# Patient Record
Sex: FEMALE | Race: WHITE | NOT HISPANIC OR LATINO | Employment: FULL TIME | URBAN - METROPOLITAN AREA
[De-identification: names, ages, dates, MRNs, and addresses within clinical notes are randomized per-mention and may not be internally consistent; named-entity substitution may affect disease eponyms.]

---

## 2017-02-02 ENCOUNTER — ALLSCRIPTS OFFICE VISIT (OUTPATIENT)
Dept: OTHER | Facility: OTHER | Age: 47
End: 2017-02-02

## 2017-02-02 DIAGNOSIS — Z12.31 ENCOUNTER FOR SCREENING MAMMOGRAM FOR MALIGNANT NEOPLASM OF BREAST: ICD-10-CM

## 2017-06-09 ENCOUNTER — GENERIC CONVERSION - ENCOUNTER (OUTPATIENT)
Dept: OTHER | Facility: OTHER | Age: 47
End: 2017-06-09

## 2017-06-09 ENCOUNTER — HOSPITAL ENCOUNTER (OUTPATIENT)
Dept: MAMMOGRAPHY | Facility: HOSPITAL | Age: 47
Discharge: HOME | End: 2017-06-09
Payer: COMMERCIAL

## 2017-06-09 DIAGNOSIS — Z12.31 ENCOUNTER FOR SCREENING MAMMOGRAM FOR MALIGNANT NEOPLASM OF BREAST: ICD-10-CM

## 2017-06-09 PROCEDURE — G0202 SCR MAMMO BI INCL CAD: HCPCS

## 2017-06-13 DIAGNOSIS — R92.8 OTHER ABNORMAL AND INCONCLUSIVE FINDINGS ON DIAGNOSTIC IMAGING OF BREAST: ICD-10-CM

## 2017-07-03 ENCOUNTER — GENERIC CONVERSION - ENCOUNTER (OUTPATIENT)
Dept: OTHER | Facility: OTHER | Age: 47
End: 2017-07-03

## 2017-07-03 ENCOUNTER — HOSPITAL ENCOUNTER (OUTPATIENT)
Dept: MAMMOGRAPHY | Facility: CLINIC | Age: 47
Discharge: HOME | End: 2017-07-03
Payer: COMMERCIAL

## 2017-07-03 DIAGNOSIS — R92.8 OTHER ABNORMAL AND INCONCLUSIVE FINDINGS ON DIAGNOSTIC IMAGING OF BREAST: ICD-10-CM

## 2017-07-03 PROCEDURE — G0206 DX MAMMO INCL CAD UNI: HCPCS

## 2017-07-10 ENCOUNTER — HOSPITAL ENCOUNTER (OUTPATIENT)
Dept: MAMMOGRAPHY | Facility: CLINIC | Age: 47
Discharge: HOME | End: 2017-07-10
Payer: COMMERCIAL

## 2017-07-10 VITALS — DIASTOLIC BLOOD PRESSURE: 64 MMHG | HEART RATE: 68 BPM | SYSTOLIC BLOOD PRESSURE: 108 MMHG

## 2017-07-10 DIAGNOSIS — Z98.890 STATUS POST BIOPSY: ICD-10-CM

## 2017-07-10 DIAGNOSIS — R92.8 ABNORMAL MAMMOGRAM, UNSPECIFIED: ICD-10-CM

## 2017-07-10 DIAGNOSIS — R92.8 OTHER ABNORMAL AND INCONCLUSIVE FINDINGS ON DIAGNOSTIC IMAGING OF BREAST: ICD-10-CM

## 2017-07-10 PROCEDURE — 88305 TISSUE EXAM BY PATHOLOGIST: CPT | Performed by: OBSTETRICS & GYNECOLOGY

## 2017-07-10 PROCEDURE — 88341 IMHCHEM/IMCYTCHM EA ADD ANTB: CPT | Performed by: OBSTETRICS & GYNECOLOGY

## 2017-07-10 PROCEDURE — 88342 IMHCHEM/IMCYTCHM 1ST ANTB: CPT | Performed by: OBSTETRICS & GYNECOLOGY

## 2017-07-10 PROCEDURE — 19081 BX BREAST 1ST LESION STRTCTC: CPT

## 2017-07-10 RX ORDER — LIDOCAINE HYDROCHLORIDE AND EPINEPHRINE BITARTRATE 20; .01 MG/ML; MG/ML
9 INJECTION, SOLUTION SUBCUTANEOUS ONCE
Status: COMPLETED | OUTPATIENT
Start: 2017-07-10 | End: 2017-07-10

## 2017-07-10 RX ORDER — LIDOCAINE HYDROCHLORIDE 10 MG/ML
4 INJECTION, SOLUTION INFILTRATION; PERINEURAL ONCE
Status: COMPLETED | OUTPATIENT
Start: 2017-07-10 | End: 2017-07-10

## 2017-07-10 RX ADMIN — LIDOCAINE HYDROCHLORIDE AND EPINEPHRINE 9 ML: 20; 10 INJECTION, SOLUTION INFILTRATION; PERINEURAL at 09:10

## 2017-07-10 RX ADMIN — LIDOCAINE HYDROCHLORIDE 4 ML: 10 INJECTION, SOLUTION INFILTRATION; PERINEURAL at 09:10

## 2017-11-09 ENCOUNTER — ALLSCRIPTS OFFICE VISIT (OUTPATIENT)
Dept: OTHER | Facility: OTHER | Age: 47
End: 2017-11-09

## 2017-11-09 DIAGNOSIS — Z00.00 ENCOUNTER FOR GENERAL ADULT MEDICAL EXAMINATION WITHOUT ABNORMAL FINDINGS: ICD-10-CM

## 2017-11-09 DIAGNOSIS — E55.9 VITAMIN D DEFICIENCY: ICD-10-CM

## 2017-11-09 DIAGNOSIS — K11.1 HYPERTROPHY OF SALIVARY GLAND: ICD-10-CM

## 2017-11-09 DIAGNOSIS — Z13.6 ENCOUNTER FOR SCREENING FOR CARDIOVASCULAR DISORDERS: ICD-10-CM

## 2017-11-21 ENCOUNTER — HOSPITAL ENCOUNTER (OUTPATIENT)
Dept: ULTRASOUND IMAGING | Facility: HOSPITAL | Age: 47
Discharge: HOME | End: 2017-11-21
Payer: COMMERCIAL

## 2017-11-21 DIAGNOSIS — K11.1 HYPERTROPHY OF SALIVARY GLAND: ICD-10-CM

## 2017-11-21 PROCEDURE — 76536 US EXAM OF HEAD AND NECK: CPT

## 2017-11-22 ENCOUNTER — GENERIC CONVERSION - ENCOUNTER (OUTPATIENT)
Dept: OTHER | Facility: OTHER | Age: 47
End: 2017-11-22

## 2017-11-22 ENCOUNTER — APPOINTMENT (OUTPATIENT)
Dept: LAB | Facility: CLINIC | Age: 47
End: 2017-11-22
Payer: COMMERCIAL

## 2017-11-22 DIAGNOSIS — Z00.00 ENCOUNTER FOR GENERAL ADULT MEDICAL EXAMINATION WITHOUT ABNORMAL FINDINGS: ICD-10-CM

## 2017-11-22 DIAGNOSIS — K11.1 HYPERTROPHY OF SALIVARY GLAND: ICD-10-CM

## 2017-11-22 DIAGNOSIS — Z13.6 ENCOUNTER FOR SCREENING FOR CARDIOVASCULAR DISORDERS: ICD-10-CM

## 2017-11-22 DIAGNOSIS — E55.9 VITAMIN D DEFICIENCY: ICD-10-CM

## 2017-11-22 LAB
25(OH)D3 SERPL-MCNC: 40.4 NG/ML (ref 30–100)
ALBUMIN SERPL BCP-MCNC: 3.2 G/DL (ref 3.5–5)
ALP SERPL-CCNC: 48 U/L (ref 46–116)
ALT SERPL W P-5'-P-CCNC: 19 U/L (ref 12–78)
ANION GAP SERPL CALCULATED.3IONS-SCNC: 8 MMOL/L (ref 4–13)
AST SERPL W P-5'-P-CCNC: 19 U/L (ref 5–45)
BASOPHILS # BLD AUTO: 0.01 THOUSANDS/ΜL (ref 0–0.1)
BASOPHILS NFR BLD AUTO: 0 % (ref 0–1)
BILIRUB SERPL-MCNC: 0.43 MG/DL (ref 0.2–1)
BUN SERPL-MCNC: 19 MG/DL (ref 5–25)
CALCIUM SERPL-MCNC: 8.5 MG/DL (ref 8.3–10.1)
CHLORIDE SERPL-SCNC: 106 MMOL/L (ref 100–108)
CHOLEST SERPL-MCNC: 162 MG/DL (ref 50–200)
CO2 SERPL-SCNC: 25 MMOL/L (ref 21–32)
CREAT SERPL-MCNC: 0.96 MG/DL (ref 0.6–1.3)
EOSINOPHIL # BLD AUTO: 0.11 THOUSAND/ΜL (ref 0–0.61)
EOSINOPHIL NFR BLD AUTO: 2 % (ref 0–6)
ERYTHROCYTE [DISTWIDTH] IN BLOOD BY AUTOMATED COUNT: 14.4 % (ref 11.6–15.1)
GFR SERPL CREATININE-BSD FRML MDRD: 71 ML/MIN/1.73SQ M
GLUCOSE P FAST SERPL-MCNC: 84 MG/DL (ref 65–99)
HCT VFR BLD AUTO: 39.1 % (ref 34.8–46.1)
HDLC SERPL-MCNC: 77 MG/DL (ref 40–60)
HGB BLD-MCNC: 12.8 G/DL (ref 11.5–15.4)
LDLC SERPL CALC-MCNC: 64 MG/DL (ref 0–100)
LYMPHOCYTES # BLD AUTO: 2.25 THOUSANDS/ΜL (ref 0.6–4.47)
LYMPHOCYTES NFR BLD AUTO: 42 % (ref 14–44)
MCH RBC QN AUTO: 29.8 PG (ref 26.8–34.3)
MCHC RBC AUTO-ENTMCNC: 32.7 G/DL (ref 31.4–37.4)
MCV RBC AUTO: 91 FL (ref 82–98)
MONOCYTES # BLD AUTO: 0.62 THOUSAND/ΜL (ref 0.17–1.22)
MONOCYTES NFR BLD AUTO: 12 % (ref 4–12)
NEUTROPHILS # BLD AUTO: 2.4 THOUSANDS/ΜL (ref 1.85–7.62)
NEUTS SEG NFR BLD AUTO: 44 % (ref 43–75)
NRBC BLD AUTO-RTO: 0 /100 WBCS
PLATELET # BLD AUTO: 237 THOUSANDS/UL (ref 149–390)
PMV BLD AUTO: 11 FL (ref 8.9–12.7)
POTASSIUM SERPL-SCNC: 3.9 MMOL/L (ref 3.5–5.3)
PROT SERPL-MCNC: 6.3 G/DL (ref 6.4–8.2)
RBC # BLD AUTO: 4.29 MILLION/UL (ref 3.81–5.12)
SODIUM SERPL-SCNC: 139 MMOL/L (ref 136–145)
TRIGL SERPL-MCNC: 106 MG/DL
WBC # BLD AUTO: 5.4 THOUSAND/UL (ref 4.31–10.16)

## 2017-11-22 PROCEDURE — 82306 VITAMIN D 25 HYDROXY: CPT

## 2017-11-22 PROCEDURE — 85025 COMPLETE CBC W/AUTO DIFF WBC: CPT

## 2017-11-22 PROCEDURE — 80061 LIPID PANEL: CPT

## 2017-11-22 PROCEDURE — 36415 COLL VENOUS BLD VENIPUNCTURE: CPT

## 2017-11-22 PROCEDURE — 80053 COMPREHEN METABOLIC PANEL: CPT

## 2017-11-24 ENCOUNTER — GENERIC CONVERSION - ENCOUNTER (OUTPATIENT)
Dept: OTHER | Facility: OTHER | Age: 47
End: 2017-11-24

## 2017-11-28 PROCEDURE — 88173 CYTOPATH EVAL FNA REPORT: CPT | Performed by: SURGERY

## 2017-11-29 ENCOUNTER — LAB REQUISITION (OUTPATIENT)
Dept: LAB | Facility: HOSPITAL | Age: 47
End: 2017-11-29
Payer: COMMERCIAL

## 2017-11-29 DIAGNOSIS — R59.9 ENLARGED LYMPH NODES: ICD-10-CM

## 2017-12-06 ENCOUNTER — HOSPITAL ENCOUNTER (OUTPATIENT)
Facility: HOSPITAL | Age: 47
Setting detail: OUTPATIENT SURGERY
Discharge: HOME | End: 2017-12-06
Attending: SURGERY | Admitting: SURGERY
Payer: COMMERCIAL

## 2017-12-06 VITALS
WEIGHT: 140 LBS | SYSTOLIC BLOOD PRESSURE: 113 MMHG | BODY MASS INDEX: 22.5 KG/M2 | HEIGHT: 66 IN | DIASTOLIC BLOOD PRESSURE: 68 MMHG | RESPIRATION RATE: 19 BRPM | TEMPERATURE: 99.1 F | OXYGEN SATURATION: 100 % | HEART RATE: 62 BPM

## 2017-12-06 DIAGNOSIS — R22.2 LOCALIZED SWELLING, MASS AND LUMP, TRUNK: ICD-10-CM

## 2017-12-06 PROCEDURE — 88331 PATH CONSLTJ SURG 1 BLK 1SPC: CPT | Performed by: PATHOLOGY

## 2017-12-06 PROCEDURE — 88307 TISSUE EXAM BY PATHOLOGIST: CPT | Performed by: SURGERY

## 2017-12-06 RX ORDER — BUPIVACAINE HYDROCHLORIDE AND EPINEPHRINE 2.5; 5 MG/ML; UG/ML
INJECTION, SOLUTION EPIDURAL; INFILTRATION; INTRACAUDAL; PERINEURAL AS NEEDED
Status: DISCONTINUED | OUTPATIENT
Start: 2017-12-06 | End: 2017-12-06 | Stop reason: HOSPADM

## 2017-12-06 NOTE — DISCHARGE INSTRUCTIONS
Please call return to the office on Monday 12/12/17  You are ok to shower just no soaks   Tylenol or Advil for pain   Please avoid aspirin   Some bruising and swelling is to be expected and is normal

## 2017-12-06 NOTE — OP NOTE
OPERATIVE REPORT  PATIENT NAME: Jessica Mathias    :  1970  MRN: [id number]  Pt Location: [facility] OR ROOM 06    SURGERY DATE: 2017    Surgeon(s) and Role:     Medardo Alvarado MD - Primary     * Claudeen Lime - Assisting    Preop Diagnosis:  Localized swelling, mass and lump, trunk [R22 2]    Post-Op Diagnosis Codes:     * Localized swelling, mass and lump, trunk [R22 2]    Procedure(s) (LRB):  EXCISION NODULE CERVICAL REGION (Right)    Specimen(s):  ID Type Source Tests Collected by Time Destination   1 : Right submandibular nodule Tissue Mass TISSUE EXAM Moon Sánchez MD 2017 1127        Estimated Blood Loss:   Minimal    Drains:       Anesthesia Type:   Local    Operative Indications:  Localized swelling, mass and lump, trunk [R22 2]      Operative Findings:  Local anesthesia    Complications:   None    Procedure and Technique:  Patient was identified visually and via armband  Placed in the supine position  The region was prepped and draped in a sterile fashion  Time-out was completed  1% lidocaine was used throughout the procedure  A half-inch incision was created over the submandibular region  The nodule was quite mobile moving approximately 1 inch in most directions  It was not fixed  Incision was carried down through skin subcutaneous tissue  The mass after muscle was split  The mobile nodule was located  It seemed and capsulated  And it was quite hard as what the preoperative evaluation was consistent with  The mass was enucleated from its surrounding surfaces  There was no evidence for attachment to contiguous structures  There is no evidence for exposure to the parotid gland  The area was irrigated  It was aspirated dry  Hemostasis was assured  The wound was then closed with 3 0 Vicryl subcutaneous and then 4 0 Prolene sutures  Steri-Strip was applied  The patient tolerated the procedure well  Sponge and instrument count correct     I was present for the entire procedure    Patient Disposition:  PACU     SIGNATURE: Moon Sánchez MD  DATE: December 6, 2017  TIME: 11:56 AM

## 2017-12-11 ENCOUNTER — GENERIC CONVERSION - ENCOUNTER (OUTPATIENT)
Dept: FAMILY MEDICINE CLINIC | Facility: CLINIC | Age: 47
End: 2017-12-11

## 2018-01-09 NOTE — MISCELLANEOUS
Message   Recorded as Task   Date: 06/13/2017 12:20 PM, Created By: Ward Drew   Task Name: Follow Up   Assigned To: Diamante Muñiz   Regarding Patient: Emre LU, Status: In Progress   Matthew Nava - 13 Jun 2017 12:20 PM     TASK CREATED  Patient aware of mammogram results, dx R92 8  Scheduled for a (L) diagnostic mammogram on 7/3/17  Please enter order in allscripts  Thanks   Dyan Teixeira - 13 Jun 2017 12:31 PM     TASK IN PROGRESS   Dyan Teixeira - 13 Jun 2017 12:37 PM     TASK EDITED  order placed in allscripts for lft diag gloria        Active Problems    1  Encounter for gynecological examination without abnormal finding (V72 31) (Z01 419)   2  Encounter for routine gynecological examination with Papanicolaou smear of cervix   (V72 31,V76 2) (Z01 419)   3  Encounter for screening mammogram for malignant neoplasm of breast (V76 12)   (Z12 31)   4  Hematuria (599 70) (R31 9)   5  Mammogram abnormal (793 80) (R92 8)   6  Screening for human papillomavirus (HPV) (V73 81) (Z11 51)   7  Urinary frequency (788 41) (R35 0)   8  Uses oral contraceptives (V25 41) (Z30 41)   9  Vitamin D deficiency (268 9) (E55 9)    Current Meds   1  Gianvi 3-0 02 MG Oral Tablet; Take 1 tablet daily; Therapy: 94HXA9953 to (Last Rx:57Lif8466)  Requested for: 98OCS4324 Ordered   2  Vitamin D (Ergocalciferol) 26410 UNIT Oral Capsule; TAKE 1 CAPSULE Weekly; Therapy: 63IVU9552 to (Evaluate:13Mar2015)  Requested for: 71CLO5005; Last   Rx:30Jan2015 Ordered    Allergies    1  Codeine Derivatives   2  Codeine Sulfate TABS   3  Tylenol with Codeine #3 TABS    Plan  Mammogram abnormal    · MAMMO DIAGNOSTIC LEFT W CAD; Status:Hold For - Scheduling,Retrospective  Authorization;  Requested JSP:[id number];     Signatures   Electronically signed by : Delia Valdivia, ; Jun 13 2017 12:37PM EST                       (Author)

## 2018-01-10 NOTE — MISCELLANEOUS
Message   Recorded as Task   Date: 07/03/2017 03:18 PM, Created By: Marilyn Knapp   Task Name: Result Follow Up   Assigned To: Laura Delgado   Regarding Patient: Voncile Ganser, Status: Active   CommentNatalio Bennett - 03 Jul 2017 3:18 PM     TASK CREATED  ask patient if she has follow up with radiology   Tomi Pipe - 03 Jul 2017 3:30 PM     TASK EDITED  BX already ordered        Active Problems    1  Encounter for gynecological examination without abnormal finding (V72 31) (Z01 419)   2  Encounter for routine gynecological examination with Papanicolaou smear of cervix   (V72 31,V76 2) (Z01 419)   3  Encounter for screening mammogram for malignant neoplasm of breast (V76 12)   (Z12 31)   4  Hematuria (599 70) (R31 9)   5  Mammogram abnormal (793 80) (R92 8)   6  Screening for human papillomavirus (HPV) (V73 81) (Z11 51)   7  Urinary frequency (788 41) (R35 0)   8  Uses oral contraceptives (V25 41) (Z30 41)   9  Vitamin D deficiency (268 9) (E55 9)    Current Meds   1  Gianvi 3-0 02 MG Oral Tablet; Take 1 tablet daily; Therapy: 88CQU3234 to (Last Rx:84Ndj2586)  Requested for: 50NFF7363 Ordered   2  Vitamin D (Ergocalciferol) 60182 UNIT Oral Capsule; TAKE 1 CAPSULE Weekly; Therapy: 37DVT6230 to (Evaluate:13Mar2015)  Requested for: 57UEQ6854; Last   Rx:30Jan2015 Ordered    Allergies    1  Codeine Derivatives   2  Codeine Sulfate TABS   3  Tylenol with Codeine #3 TABS    Signatures   Electronically signed by :  Moy Neely, ; Jul  3 2017  3:30PM EST                       (Author)

## 2018-01-11 NOTE — PROGRESS NOTES
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)   3  Encounter for screening for cardiovascular disorders (V81 2) (Z13 6)   4  Tetanus-diphtheria (Td) vaccination (V06 5) (Z23)    Plan  Encounter for screening for cardiovascular disorders    · (1) LIPID PANEL, FASTING; Status:Active; Requested FGW:49HWP0478; Health Maintenance    · Follow-up visit in 1 year Evaluation and Treatment  Follow-up  Status: Hold For -  Scheduling  Requested for: 24OXN5767   · Drink plenty of fluids ; Status:Complete;   Done: 85ZIY1922 08:53AM   · Eat a normal well-balanced diet ; Status:Complete;   Done: 96PDY4074 08:53AM   · Eat foods that are high in calcium ; Status:Complete;   Done: 39FTL2650 08:53AM   · We encourage all of our patients to exercise regularly  30 minutes of exercise or physical  activity five or more days a week is recommended for children and adults ;  Status:Complete;   Done: 79GMG8240 08:53AM  Health Maintenance, Submandibular gland hypertrophy    · (1) CBC/PLT/DIFF; Status:Active; Requested for:09Nov2017;    · (1) COMPREHENSIVE METABOLIC PANEL; Status:Active; Requested for:09Nov2017;   Submandibular gland hypertrophy    · US HEAD NECK SOFT TISSUE; Status:Hold For - Scheduling; Requested  for:09Nov2017;   Tetanus-diphtheria (Td) vaccination    · Adacel 5-2-15 5 LF-MCG/0 5 Intramuscular Suspension; INJECT 0 5  ML  Intramuscular; To Be Done: 99BIA8440  Vitamin D deficiency    · (1) VITAMIN D 25-HYDROXY; Status:Active; Requested BDN:63PUR6848;     Discussion/Summary  health maintenance visit Currently, she eats a healthy diet and has an adequate exercise regimen  the risks and benefits of cervical cancer screening were discussed cervical cancer screening is current Breast cancer screening: mammogram is current  Colorectal cancer screening: colorectal cancer screening is not indicated   Screening lab work includes hemoglobin, glucose, lipid profile and 25-hydroxyvitamin D  The immunizations will be given as outlined in the orders  Advice and education were given regarding nutrition, aerobic exercise, weight bearing exercise, calcium supplements, vitamin D supplements, reproductive health, cardiovascular risk reduction and alcohol use  22-year-old healthy adult here for annual physical  No concerns on physical exam today except for enlarged palpable submandibular lymph node for which I suggested an ultrasound of the area  Will follow up with the results when available  Possible side effects of new medications were reviewed with the patient/guardian today  The treatment plan was reviewed with the patient/guardian  The patient/guardian understands and agrees with the treatment plan      Chief Complaint  Preventative      History of Present Illness  HM, Adult Female: The patient is being seen for a health maintenance evaluation  The last health maintenance visit was year(s) ago  Social History: Household members include 2 kids  She is   Work status: occupation: Professor  The patient has never smoked cigarettes  She reports occasional alcohol use  General Health: The patient's health since the last visit is described as good  She has regular dental visits  She denies vision problems  She denies hearing loss  Lifestyle:  She consumes a diverse and healthy diet  She does not have any weight concerns  She exercises regularly  She does not use tobacco  She consumes alcohol  She reports occasional alcohol use  Reproductive health:  she reports normal menses  Menstrual history: LMP: the last menstrual period was 10/17/17 and it was of a normal amount and duration  she uses contraception  For contraception, she uses oral contraception pills  Screening: cancer screening reviewed and current  metabolic screening reviewed and current     HPI: Patient is here for annual physical   She is concerned about a small swelling under her right jaw  Patient states that the swelling is present for the past 15 years and has not changed  She does report that the area appears slightly more firm than usual   She denies any symptoms of frequent upper respiratory or ear infection/ difficulty swallowing  Review of Systems    Constitutional: as noted in HPI  Eyes: No complaints of eye pain, no red eyes, no eyesight problems, no discharge, no dry eyes, no itching of eyes  ENT: no complaints of earache, no loss of hearing, no nose bleeds, no nasal discharge, no sore throat, no hoarseness  Cardiovascular: No complaints of slow heart rate, no fast heart rate, no chest pain, no palpitations, no leg claudication, no lower extremity edema  Respiratory: No complaints of shortness of breath, no wheezing, no cough, no SOB on exertion, no orthopnea, no PND  Gastrointestinal: No complaints of abdominal pain, no constipation, no nausea or vomiting, no diarrhea, no bloody stools  Musculoskeletal: No complaints of arthralgias, no myalgias, no joint swelling or stiffness, no limb pain or swelling  Neurological: No complaints of headache, no confusion, no convulsions, no numbness, no dizziness or fainting, no tingling, no limb weakness, no difficulty walking  Psychiatric: Not suicidal, no sleep disturbance, no anxiety or depression, no change in personality, no emotional problems  Endocrine: No complaints of proptosis, no hot flashes, no muscle weakness, no deepening of the voice, no feelings of weakness  Hematologic/Lymphatic: as noted in HPI  Active Problems    1  Encounter for gynecological examination without abnormal finding (V72 31) (Z01 419)   2  Encounter for routine gynecological examination with Papanicolaou smear of cervix   (V72 31,V76 2) (Z01 419)   3  Encounter for screening mammogram for malignant neoplasm of breast (V76 12)   (Z12 31)   4  Hematuria (599 70) (R31 9)   5  Mammogram abnormal (793 80) (R92 8)   6   Need for influenza vaccination (V04 81) (Z23)   7  Screening for human papillomavirus (HPV) (V73 81) (Z11 51)   8  Urinary frequency (788 41) (R35 0)   9  Uses oral contraceptives (V25 41) (Z30 41)   10  Vitamin D deficiency (268 9) (E55 9)    Past Medical History    · History of Acute Bacterial Pyelonephritis (590 10)   · History of Cough (786 2) (R05)   · History of High risk HPV infection (079 4) (B97 7)   · History of acute sinusitis (V12 69) (Z87 09)   · History of allergic rhinitis (V12 69) (Z87 09)   · History of headache (V13 89) (H43 486)   · History of infectious mononucleosis (V12 09) (Z86 19)   · History of urinary tract infection (V13 02) (Z87 440)   · History of Low grade squamous intraepithelial lesion (LGSIL) on Papanicolaou smear of  cervix (795 03) (F49 647)   · History of Mild cervical dysplasia (622 11) (N87 0)   · History of Multiple abrasions (919 0) (T07  XXXA)   · Normal delivery (650) (O80,Z37  9)   · History of Urinary Tract Infection (V13 02)    Surgical History    · History of Colposcopy Cervix With Biopsy(S) With Endocervical Curettage    Family History  Mother    · Denied: Family history of depression   · Denied: Family history of substance abuse  Father    · Denied: Family history of depression   · Denied: Family history of substance abuse  Sibling    · Denied: Family history of depression   · Denied: Family history of substance abuse  Maternal Grandmother    · Family history of Heart Disease (V17 49)   · Family history of Hypertension (V17 49)  Paternal Grandmother    · Family history of Amyotrophic Lateral Sclerosis  Maternal Grandfather    · Family history of Prostate Cancer (V16 42)  Paternal Grandfather    · Family history of Parkinson Disease  Family History    · Family history of Arthritis (V17 7)   · Family history of Cystic Fibrosis (V18 19)   · Family history of Headache Syndromes   · Family history of Osteoporosis (V17 81)   · Family history of Trisomy 24 (Down Syndrome)    Social History    · Being A Social Drinker   · Daily Coffee Consumption (2  Cups/Day)   · Exercising Regularly   · Marital History - Currently    · Never A Smoker   · Uses oral contraceptives (V25 41) (Z30 41)    Current Meds   1  Allergy Relief 180 MG Oral Tablet; 1 TAB DAILY; Therapy: 36NVE9522 to Recorded   2  Gianvi 3-0 02 MG Oral Tablet; Take 1 tablet daily; Therapy: 60VWK8653 to (Last Rx:83Bji0228)  Requested for: 38VEJ6903 Ordered   3  Vitamin D (Ergocalciferol) 51180 UNIT Oral Capsule; TAKE 1 CAPSULE Weekly; Therapy: 16NBW1965 to (Evaluate:13Mar2015)  Requested for: 14SMR0849; Last   Rx:30Jan2015 Ordered    Allergies    1  Codeine Derivatives   2  Codeine Sulfate TABS   3  Tylenol with Codeine #3 TABS    Vitals   Recorded: 40RMN1954 08:10AM   Heart Rate 72   Respiration 14   Systolic 328   Diastolic 64   Height 5 ft 6 2 in   Weight 141 lb    BMI Calculated 22 62   BSA Calculated 1 73   O2 Saturation 100     Physical Exam    Constitutional   General appearance: No acute distress, well appearing and well nourished  Ears, Nose, Mouth, and Throat   Otoscopic examination: Tympanic membranes translucent with normal light reflex  Canals patent without erythema  Oropharynx: Normal with no erythema, edema, exudate or lesions  Neck   Neck: Supple, symmetric, trachea midline, no masses  Thyroid: Normal, no thyromegaly  Pulmonary   Auscultation of lungs: Clear to auscultation  Cardiovascular   Auscultation of heart: Normal rate and rhythm, normal S1 and S2, no murmurs  Examination of extremities for edema and/or varicosities: Normal     Abdomen   Abdomen: Non-tender, no masses  Liver and spleen: No hepatomegaly or splenomegaly  Lymphatic   Palpation of lymph nodes in neck: Abnormal   0 5 cm right submandibular gland, firm, mobile  Musculoskeletal   Gait and station: Normal     Neurologic   Cortical function: Normal mental status  Coordination: Normal finger to nose and heel to shin  Psychiatric   Orientation to person, place, and time: Normal     Mood and affect: Normal        Results/Data  PHQ-2 Adult Depression Screening 51AXS3379 08:21AM User, Ahs     Test Name Result Flag Reference   PHQ-2 Adult Depression Score 0     Over the last two weeks, how often have you been bothered by any of the following problems? Little interest or pleasure in doing things: Not at all - 0  Feeling down, depressed, or hopeless: Not at all - 0   PHQ-2 Adult Depression Screening Negative         Future Appointments    Date/Time Provider Specialty Site   02/05/2018 01:20 PM KADEN Dyer   Obstetrics/Gynecology [facility]     Signatures   Electronically signed by : Kurt Sapp MD; Nov 9 2017  8:54AM EST                       (Author)

## 2018-01-12 NOTE — RESULT NOTES
Verified Results  (1) URINALYSIS w URINE C/S REFLEX (will reflex a microscopy if leukocytes, occult blood, or nitrites are not within normal limits) 03AJA9460 12:00AM Vj Lee     Test Name Result Flag Reference   COLOR YELLOW  YELLOW   APPEARANCE CLEAR  CLEAR   SPECIFIC GRAVITY 1 005  1 001-1 035   PH 6 5  5 0-8 0   GLUCOSE NEGATIVE  NEGATIVE   BILIRUBIN NEGATIVE  NEGATIVE   KETONES NEGATIVE  NEGATIVE   OCCULT BLOOD 2+ A NEGATIVE   PROTEIN NEGATIVE  NEGATIVE   NITRITE NEGATIVE  NEGATIVE   LEUKOCYTE ESTERASE 2+ A NEGATIVE   WBC 6-10 /HPF A < OR = 5   RBC 0-2 /HPF  < OR = 2   SQUAMOUS EPITHELIAL CELLS 0-5 /HPF  < OR = 5   BACTERIA MANY /HPF A NONE SEEN   HYALINE CAST NONE SEEN /LPF  NONE SEEN   REFLEXIVE URINE CULTURE      CULTURE INDICATED - RESULTS TO FOLLOW     REFLEXIVE URINE CULTURE 15Jun2016 12:00AM Vj Lee     Test Name Result Flag Reference   CULTURE, URINE, ROUTINE  A    CULTURE, URINE, ROUTINE         MICRO NUMBER:      [id number]    TEST STATUS:       FINAL    SPECIMEN SOURCE:   URINE    SPECIMEN QUALITY:  ADEQUATE    RESULT:            Greater than 100,000 CFU/mL of Escherichia coli                            E coli                            ----------------                            INT   JANNETTE     AMOX/CLAVULANATE       S     <=2 0     AMPICILLIN             S     <=2 0     AMP/SULBACTAM          S     <=2 0     CEFAZOLIN              NR    <=4 0 **1     CEFEPIME               S     <=1 0     CEFTRIAXONE            S     <=1 0     CIPROFLOXACIN          S     <=0 25     ERTAPENEM              S     <=0 5     GENTAMICIN             S     <=1 0     IMIPENEM               S     <=0 25     LEVOFLOXACIN           S     <=0 12     NITROFURANTOIN         S     <=16 0     PIP/TAZOBACTAM         S     <=4 0     TOBRAMYCIN             S     <=1 0     TRIMETHOPRIM/SULFA     S     <=20 0  S=Susceptible  I=Intermediate  R=Resistant  * = Not Tested  NR = Not Reported  **NN = See Therapy Comments  THERAPY COMMENTS      Note 1:      ORAL therapy: A cefazolin JANNETTE of < 32 predicts      susceptibility to the oral agents cefaclor,      cefdinir, cefpodoxime, cefprozil, cefuroxime,      cephalexin, and loracarbef when used for therapy      of uncomplicated UTIs due to E  coli,      K  pneumoniae, and P  mirabilis  PARENTERAL therapy: A cefazolin JANNETTE of > 8      indicates resistance to parenteral cefazolin  An alternate test method must be performed to      to confirm susceptibility to parenteral cefazolin

## 2018-01-13 VITALS
WEIGHT: 135 LBS | DIASTOLIC BLOOD PRESSURE: 72 MMHG | SYSTOLIC BLOOD PRESSURE: 118 MMHG | BODY MASS INDEX: 21.69 KG/M2 | HEIGHT: 66 IN

## 2018-01-14 VITALS
OXYGEN SATURATION: 100 % | RESPIRATION RATE: 14 BRPM | BODY MASS INDEX: 22.66 KG/M2 | SYSTOLIC BLOOD PRESSURE: 102 MMHG | HEART RATE: 72 BPM | DIASTOLIC BLOOD PRESSURE: 64 MMHG | HEIGHT: 66 IN | WEIGHT: 141 LBS

## 2018-01-15 NOTE — RESULT NOTES
Verified Results  US HEAD NECK SOFT TISSUE 21Nov2017 01:22PM Gallito Maurice Order Number: [id number]    - Patient Instructions: To schedule this appointment, please contact Central Scheduling at [phone]  Test Name Result Flag Reference   US HEAD NECK SOFT TISSUE (Report)     This is a summary report  The complete report is available in the patient's medical record  If you cannot access the medical record, please contact the sending organization for a detailed fax or copy  HEAD/NECK SOFT TISSUE ULTRASOUND     INDICATION: Palpable abnormality, right upper neck  Described as having been present for approximately 15 years       COMPARISON: There are no prior pertinent studies at [address] [place] [id number]  TECHNIQUE:  Real-time ultrasound of the area of concern was performed with a linear transducer with both volumetric sweeps and still imaging techniques  FINDINGS: Ultrasound of the area of concern demonstrates the presence of a hypoechoic heterogeneous subcutaneous nodule with a small amount of internal blood flow, measuring 0 9 x 1 2 x 1 1 cm in size  This is located approximately 2 to 3 mm below the    skin surface  IMPRESSION:     Nonspecific 0 9 x 1 2 x 1 1 cm nodule corresponds to the patient's palpable abnormality located in the submandibular portion of the right neck  Imaging characteristics are nonspecific  However, this may 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

## 2018-01-17 NOTE — MISCELLANEOUS
Message   Recorded as Task   Date: 07/03/2017 11:49 AM, Created By: Allison Trevino   Task Name: Follow Up   Assigned To: Jason Kitchen   Regarding Patient: Luis Antonio Vasques, Status: Active   Comment:    Adrianne Bowen - 03 Jul 2017 11:49 AM     TASK CREATED  Please order Left Breast Stereotactic Biopsy  Katya has an appt at [facility] 7/10/17  Thank you  Jose Escamilla - 87 Jul 2017 11:57 AM     TASK EDITED  Order entered        Active Problems    1  Encounter for gynecological examination without abnormal finding (V72 31) (Z01 419)   2  Encounter for routine gynecological examination with Papanicolaou smear of cervix   (V72 31,V76 2) (Z01 419)   3  Encounter for screening mammogram for malignant neoplasm of breast (V76 12)   (Z12 31)   4  Hematuria (599 70) (R31 9)   5  Mammogram abnormal (793 80) (R92 8)   6  Screening for human papillomavirus (HPV) (V73 81) (Z11 51)   7  Urinary frequency (788 41) (R35 0)   8  Uses oral contraceptives (V25 41) (Z30 41)   9  Vitamin D deficiency (268 9) (E55 9)    Current Meds   1  Gianvi 3-0 02 MG Oral Tablet; Take 1 tablet daily; Therapy: 68TBK8732 to (Last Rx:42Lnp6298)  Requested for: 33BHT1964 Ordered   2  Vitamin D (Ergocalciferol) 00385 UNIT Oral Capsule; TAKE 1 CAPSULE Weekly; Therapy: 52DGW0752 to (Evaluate:13Mar2015)  Requested for: 70BGY5114; Last   Rx:30Jan2015 Ordered    Allergies    1  Codeine Derivatives   2  Codeine Sulfate TABS   3  Tylenol with Codeine #3 TABS    Plan  Mammogram abnormal    · MAMMO STEREOTACTIC BREAST BIOPSY LEFT ([facility]); Status:Hold For -  [facility]; Requested for:49Qkk4183;     Signatures   Electronically signed by :  Arcenio Neely, ; Jul  3 2017 11:57AM EST                       (Author)

## 2018-01-22 VITALS
HEART RATE: 83 BPM | DIASTOLIC BLOOD PRESSURE: 70 MMHG | RESPIRATION RATE: 16 BRPM | WEIGHT: 141 LBS | HEIGHT: 66 IN | BODY MASS INDEX: 22.66 KG/M2 | SYSTOLIC BLOOD PRESSURE: 110 MMHG | OXYGEN SATURATION: 99 %

## 2018-01-31 PROBLEM — R92.8 MAMMOGRAM ABNORMAL: Status: ACTIVE | Noted: 2017-06-13

## 2018-01-31 PROBLEM — D11.9 PLEOMORPHIC ADENOMA OF SUBMANDIBULAR GLAND: Status: ACTIVE | Noted: 2017-11-24

## 2018-01-31 PROBLEM — K11.1 SUBMANDIBULAR GLAND HYPERTROPHY: Status: ACTIVE | Noted: 2017-11-09

## 2018-02-13 NOTE — RESULT NOTES
Verified Results  (1) CBC/PLT/DIFF [id number] 07:20AM César [facility] Order Number: [id number]     Test Name Result Flag Reference   WBC COUNT 5 40 Thousand/uL  4 31-10 16   RBC COUNT 4 29 Million/uL  3 81-5 12   HEMOGLOBIN 12 8 g/dL  11 5-15 4   HEMATOCRIT 39 1 %  34 8-46  1   MCV 91 fL  82-98   MCH 29 8 pg  26 8-34 3   MCHC 32 7 g/dL  31 4-37 4   RDW 14 4 %  11 6-15 1   MPV 11 0 fL  8 9-12 7   PLATELET COUNT 764 Thousands/uL  149-390   nRBC AUTOMATED 0 /100 WBCs     NEUTROPHILS RELATIVE PERCENT 44 %  43-75   LYMPHOCYTES RELATIVE PERCENT 42 %  14-44   MONOCYTES RELATIVE PERCENT 12 %  4-12   EOSINOPHILS RELATIVE PERCENT 2 %  0-6   BASOPHILS RELATIVE PERCENT 0 %  0-1   NEUTROPHILS ABSOLUTE COUNT 2 40 Thousands/? ??L  1 85-7 62   LYMPHOCYTES ABSOLUTE COUNT 2 25 Thousands/? ??L  0 60-4 47   MONOCYTES ABSOLUTE COUNT 0 62 Thousand/? ??L  0 17-1 22   EOSINOPHILS ABSOLUTE COUNT 0 11 Thousand/? ??L  0 00-0 61   BASOPHILS ABSOLUTE COUNT 0 01 Thousands/? ??L  0 00-0 10     (1) COMPREHENSIVE METABOLIC PANEL 82TPX1630 64:47TQ C[facility]ar [facility] Order Number: [id number]     Test Name Result Flag Reference   SODIUM 139 mmol/L  136-145   POTASSIUM 3 9 mmol/L  3 5-5 3   CHLORIDE 106 mmol/L  100-108   CARBON DIOXIDE 25 mmol/L  21-32   ANION GAP (CALC) 8 mmol/L  4-13   BLOOD UREA NITROGEN 19 mg/dL  5-25   CREATININE 0 96 mg/dL  0 60-1 30   Standardized to IDMS reference method   CALCIUM 8 5 mg/dL  8 3-10 1   BILI, TOTAL 0 43 mg/dL  0 20-1 00   ALK PHOSPHATAS 48 U/L     ALT (SGPT) 19 U/L  12-78   Specimen collection should occur prior to Sulfasalazine and/or Sulfapyridine administration due to the potential for falsely depressed results  AST(SGOT) 19 U/L  5-45   Specimen collection should occur prior to Sulfasalazine administration due to the potential for falsely depressed results     ALBUMIN 3 2 g/dL L 3 5-5 0   TOTAL PROTEIN 6 3 g/dL L 6 4-8 2   eGFR 71 ml/min/1 73sq m     National Kidney Disease Education Program recommendations are as follows:  GFR calculation is accurate only with a steady state creatinine  Chronic Kidney disease less than 60 ml/min/1 73 sq  meters  Kidney failure less than 15 ml/min/1 73 sq  meters  GLUCOSE FASTING 84 mg/dL  65-99   Specimen collection should occur prior to Sulfasalazine administration due to the potential for falsely depressed results  Specimen collection should occur prior to Sulfapyridine administration due to the potential for falsely elevated results  (1) LIPID PANEL, FASTING 22Nov2017 07:20AM Nova Lignum Order Number: [id number]     Test Name Result Flag Reference   CHOLESTEROL 162 mg/dL     HDL,DIRECT 77 mg/dL H 40-60   Specimen collection should occur prior to Metamizole administration due to the potential for falsley depressed results  LDL CHOLESTEROL CALCULATED 64 mg/dL  0-100   Triglyceride:        Normal <150 mg/dl   Borderline High 150-199 mg/dl   High 200-499 mg/dl   Very High >499 mg/dl      Cholesterol:       Desirable <200 mg/dl    Borderline High 200-239 mg/dl    High >239 mg/dl      HDL Cholesterol:       High>59 mg/dL    Low <41 mg/dL      This screening LDL is a calculated result  It does not have the accuracy of the Direct Measured LDL in the monitoring of patients with hyperlipidemia and/or statin therapy  Direct Measure LDL (VHO127) must be ordered separately in these patients  TRIGLYCERIDES 106 mg/dL  <=150   Specimen collection should occur prior to N-Acetylcysteine or Metamizole administration due to the potential for falsely depressed results       (1) VITAMIN D 25-HYDROXY 22Nov2017 07:20AM Nova Lignum Order Number: [id number]     Test Name Result Flag Reference   VIT D 25-HYDROX 40 4 ng/mL  30 0-100 0   This assay is a certified procedure of the CDC Vitamin D Standardization Certification Program (VDSCP)     Deficiency <20ng/ml   Insufficiency 20-30ng/ml   Sufficient  ng/ml *Patients undergoing fluorescein dye angiography may retain small amounts of fluorescein in the body for 48-72 hours post procedure  Samples containing fluorescein can produce falsely elevated Vitamin D values  If the patient had this procedure, a specimen should be resubmitted post fluorescein clearance

## 2018-02-22 ENCOUNTER — OFFICE VISIT (OUTPATIENT)
Dept: OBGYN CLINIC | Facility: CLINIC | Age: 48
End: 2018-02-22
Payer: COMMERCIAL

## 2018-02-22 VITALS — HEIGHT: 66 IN | WEIGHT: 141.8 LBS | BODY MASS INDEX: 22.79 KG/M2

## 2018-02-22 DIAGNOSIS — Z01.419 ENCOUNTER FOR GYNECOLOGICAL EXAMINATION (GENERAL) (ROUTINE) WITHOUT ABNORMAL FINDINGS: Primary | ICD-10-CM

## 2018-02-22 DIAGNOSIS — R92.2 DENSE BREAST TISSUE: ICD-10-CM

## 2018-02-22 DIAGNOSIS — Z12.31 ENCOUNTER FOR SCREENING MAMMOGRAM FOR MALIGNANT NEOPLASM OF BREAST: ICD-10-CM

## 2018-02-22 DIAGNOSIS — Z30.41 ORAL CONTRACEPTIVE USE: ICD-10-CM

## 2018-02-22 DIAGNOSIS — Z11.51 SCREENING FOR HUMAN PAPILLOMAVIRUS (HPV): ICD-10-CM

## 2018-02-22 PROCEDURE — 87624 HPV HI-RISK TYP POOLED RSLT: CPT | Performed by: OBSTETRICS & GYNECOLOGY

## 2018-02-22 PROCEDURE — G0145 SCR C/V CYTO,THINLAYER,RESCR: HCPCS | Performed by: OBSTETRICS & GYNECOLOGY

## 2018-02-22 PROCEDURE — 88141 CYTOPATH C/V INTERPRET: CPT | Performed by: PATHOLOGY

## 2018-02-22 PROCEDURE — S0612 ANNUAL GYNECOLOGICAL EXAMINA: HCPCS | Performed by: OBSTETRICS & GYNECOLOGY

## 2018-02-22 RX ORDER — DROSPIRENONE AND ETHINYL ESTRADIOL 0.02-3(28)
1 KIT ORAL DAILY
COMMUNITY
Start: 2016-01-04 | End: 2018-02-22 | Stop reason: SDUPTHER

## 2018-02-22 RX ORDER — ERGOCALCIFEROL 1.25 MG/1
1 CAPSULE ORAL WEEKLY
COMMUNITY
Start: 2015-01-30 | End: 2018-05-07 | Stop reason: ALTCHOICE

## 2018-02-22 RX ORDER — FEXOFENADINE HCL 180 MG/1
1 TABLET ORAL DAILY
COMMUNITY
Start: 2017-11-09 | End: 2020-07-06

## 2018-02-22 RX ORDER — DROSPIRENONE AND ETHINYL ESTRADIOL 0.02-3(28)
1 KIT ORAL DAILY
Qty: 28 TABLET | Refills: 11 | Status: SHIPPED | OUTPATIENT
Start: 2018-02-22 | End: 2018-11-15

## 2018-02-22 NOTE — PROGRESS NOTES
Patient presents today for a yearly GYN exam  Her son is currently working towards his [employer] and is playing 3 instruments  Her daughter is taking dance classes at [address], playing soccer and playing instruments  Is now

## 2018-02-22 NOTE — PROGRESS NOTES
Johny Pitt is a 52 y o  female who is here for a routine gyn exam,    Patient needs her OC refill  She has normal menses and cycles  Last year she had both a breast biopsy and a excisional biopsy of a submandibular nodule all was benign         Patient Active Problem List   Diagnosis    Hematuria    Mammogram abnormal    Pleomorphic adenoma of submandibular gland    Submandibular gland hypertrophy    Increased frequency of urination    Vitamin D deficiency         Social History     Social History    Marital status:      Spouse name: N/A    Number of children: N/A    Years of education: N/A     Occupational History    Not on file       Social History Main Topics    Smoking status: Never Smoker    Smokeless tobacco: Never Used    Alcohol use Yes      Comment: Socially    Drug use: No    Sexual activity: Yes     Partners: Male     Birth control/ protection: Pill     Other Topics Concern    Not on file     Social History Narrative    Daily Coffee [place]: 2 cups/day    Exercising regularly         Family History   Problem Relation Age of Onset    Depression Mother      Denied per [employer]    Depression Father      Denied per Allscripts    Heart disease Maternal Grandmother     Hypertension Maternal Grandmother     Prostate cancer Maternal Grandfather     ALS Paternal Grandmother     Parkinsonism Paternal Grandfather     Arthritis Family     Cystic fibrosis Family      patient not a carrier    Other Family      Headache Syndromes    Down syndrome Family      nephew    Osteoporosis Family      Past Medical History:   Diagnosis Date    Abnormal Pap smear of cervix     Acute sinusitis     Allergic rhinitis     Onset: [id number]    Dysplasia of cervix     MILD  LAST ASSESSED 8/18/2014    Head ache     HPV in female     LGSIL of cervix of undetermined significance     Low grade squamous intraepithelial lesion (LGSIL) on Papanicolaou smear of cervix     Resolved: 2014    Mild dysplasia of cervix (ELIANA I)     Last Assessed: [id number]    Mononucleosis     Resolved:     Normal delivery     2003 son, 2005 daughter    Pyelonephritis     UTI (urinary tract infection)        Current Outpatient Prescriptions:     cholecalciferol (VITAMIN D3) 1,000 units tablet, Take 1,000 Units by mouth daily, Disp: , Rfl:     drospirenone-ethinyl estradiol (GIANVI) 3-0 02 MG per tablet, Take 1 tablet by mouth daily, Disp: 28 tablet, Rfl: 11    fexofenadine (ALLEGRA) 180 MG tablet, Take 1 tablet by mouth daily, Disp: , Rfl:     ergocalciferol (VITAMIN D2) 50,000 units, Take 1 capsule by mouth once a week, Disp: , Rfl:     Review of Systems   Constitutional: Negative for fatigue  Respiratory: Negative for shortness of breath  Cardiovascular: Negative for chest pain and palpitations  Gastrointestinal: Negative for abdominal distention, abdominal pain and constipation  Genitourinary: Negative for dyspareunia, frequency, hematuria and pelvic pain  Bladder control: stress incontinence no                             urgency   no    2 Para       denies complaints with intercourse  Gynecologic History  Patient's last menstrual period was 2018 (exact date)  Her birth control is: OCP (estrogen/progesterone)  Last Pap:   Results were: normal  Last mammogram:    Results were: as above        The following portions of the patient's history were reviewed and updated as appropriate: allergies, current medications, past family history, past medical history, past social history, past surgical history and problem list     Review of Systems  Review of Systems     Objective     Ht 5' 6" (1 676 m)   Wt 64 3 kg (141 lb 12 8 oz)   LMP 2018 (Exact Date)   BMI 22 89 kg/m²     General Appearance:    Alert, cooperative, no distress, appears stated age                               Lungs:     Clear to auscultation bilaterally, respirations unlabored        Heart: Regular rate and rhythm, S1 and S2 normal, no murmur, rub   or gallop   Breast Exam:  normal nipple, no mass, no nipple discharge   Abdomen:     Soft, non-tender, bowel sounds active all four quadrants,     no masses, no organomegaly     Genitalia      :Vulva: normal, no lesions  Vagina: normal mucosa  Cervix: normal appearance and thin prep PAP obtained  Uterus: normal size, anteverted  Adnexa: normal adnexa and no mass, fullness, tenderness     Rectal:   normal   Extremities:   Extremities normal, atraumatic, no cyanosis or edema       Skin:   Skin color, texture, turgor normal, no rashes or lesions   Lymph nodes:   Cervical, supraclavicular, and axillary nodes normal             Assessment:  Encounter Diagnoses   Name Primary?  Encounter for screening mammogram for malignant neoplasm of breast     Encounter for gynecological examination (general) (routine) without abnormal findings Yes    Dense breast tissue     Screening for human papillomavirus (HPV)     Oral contraceptive use            Normal gynecological evalation and well visit      OC refill     Plan

## 2018-02-23 LAB — HPV RRNA GENITAL QL NAA+PROBE: ABNORMAL

## 2018-02-28 LAB
LAB AP GYN PRIMARY INTERPRETATION: NORMAL
LAB AP LMP: NORMAL
Lab: NORMAL
PATH INTERP SPEC-IMP: NORMAL

## 2018-03-05 DIAGNOSIS — Z30.09 BIRTH CONTROL COUNSELING: Primary | ICD-10-CM

## 2018-03-05 RX ORDER — DROSPIRENONE AND ETHINYL ESTRADIOL 0.02-3(28)
1 KIT ORAL DAILY
Qty: 90 TABLET | Refills: 1 | Status: SHIPPED | OUTPATIENT
Start: 2018-03-05 | End: 2018-05-07 | Stop reason: ALTCHOICE

## 2018-03-07 PROBLEM — R87.610 ASCUS WITH POSITIVE HIGH RISK HPV CERVICAL: Status: ACTIVE | Noted: 2018-03-07

## 2018-03-07 PROBLEM — R87.810 ASCUS WITH POSITIVE HIGH RISK HPV CERVICAL: Status: ACTIVE | Noted: 2018-03-07

## 2018-03-15 ENCOUNTER — PROCEDURE VISIT (OUTPATIENT)
Dept: OBGYN CLINIC | Facility: CLINIC | Age: 48
End: 2018-03-15
Payer: COMMERCIAL

## 2018-03-15 VITALS — DIASTOLIC BLOOD PRESSURE: 62 MMHG | WEIGHT: 141 LBS | SYSTOLIC BLOOD PRESSURE: 106 MMHG | BODY MASS INDEX: 22.76 KG/M2

## 2018-03-15 DIAGNOSIS — R87.610 ASCUS WITH POSITIVE HIGH RISK HPV CERVICAL: Primary | ICD-10-CM

## 2018-03-15 DIAGNOSIS — R87.810 ASCUS WITH POSITIVE HIGH RISK HPV CERVICAL: Primary | ICD-10-CM

## 2018-03-15 PROCEDURE — 88305 TISSUE EXAM BY PATHOLOGIST: CPT | Performed by: PATHOLOGY

## 2018-03-15 PROCEDURE — 88344 IMHCHEM/IMCYTCHM EA MLT ANTB: CPT | Performed by: PATHOLOGY

## 2018-03-15 PROCEDURE — 57454 BX/CURETT OF CERVIX W/SCOPE: CPT | Performed by: OBSTETRICS & GYNECOLOGY

## 2018-03-15 NOTE — PROGRESS NOTES
Jose [facility]  YOB: 1970    Colposcopy Procedure Note  Indications: Pap smear result is from February 22, 2018 showing  ASCUS with POSITIVE high risk HPV  Prior cervical/vaginal disease: LGSIL  Prior cervical treatment: none  The risks and benefits of the procedure were explained  and verbal  informed consent was obtained  Procedure Details   The risks and benefits of the procedure were explained  and verbal  informed consent was obtained  In the dorsal lithotomy position a bivalve speculum placed was in vagina and excellent visualization of cervix was achieved,  The was cervix swabbed three times with 5% acetic acid solution  Findings:  Cervix: no visible lesions; cervix swabbed with Lugol's solution  T zone visualized  Vaginal inspection: vaginal colposcopy not performed  Specimens: ECC and 7  oclock    Complications: none  Plan:  Specimens labelled and sent to Pathology  Will base further treatment on Pathology findings  Post biopsy instructions given to patient

## 2018-03-26 PROBLEM — N87.1 MODERATE DYSPLASIA OF CERVIX (CIN II): Status: ACTIVE | Noted: 2018-03-26

## 2018-05-01 ENCOUNTER — TELEPHONE (OUTPATIENT)
Dept: OBGYN CLINIC | Facility: CLINIC | Age: 48
End: 2018-05-01

## 2018-05-07 ENCOUNTER — PROCEDURE VISIT (OUTPATIENT)
Dept: OBGYN CLINIC | Facility: CLINIC | Age: 48
End: 2018-05-07
Payer: COMMERCIAL

## 2018-05-07 VITALS — DIASTOLIC BLOOD PRESSURE: 80 MMHG | WEIGHT: 141 LBS | SYSTOLIC BLOOD PRESSURE: 120 MMHG | BODY MASS INDEX: 22.76 KG/M2

## 2018-05-07 DIAGNOSIS — N87.1 MODERATE DYSPLASIA OF CERVIX (CIN II): Primary | ICD-10-CM

## 2018-05-07 PROCEDURE — 88307 TISSUE EXAM BY PATHOLOGIST: CPT | Performed by: PATHOLOGY

## 2018-05-07 PROCEDURE — 57522 CONIZATION OF CERVIX: CPT | Performed by: OBSTETRICS & GYNECOLOGY

## 2018-05-07 NOTE — PROGRESS NOTES
5/7/2018        [facility]  YOB: 1970        Preop Diagnosis: ELIANA II    Postop Diagnosis : same    Anesthesia: IV propofol, IV ketorolac    Procedure: LEEP    /80 (BP Location: Left arm, Patient Position: Sitting, Cuff Size: Standard)   Wt 64 kg (141 lb)   LMP 05/04/2018 (Exact Date)   BMI 22 76 kg/m²     Specimen: ectocervix for routine pathologic studies  Loop electrode size: 20x20 mm    Blood loss was: minimal    Prior to procedure informed consent was obtained, alternatives were discussed, patient's questions were answered    In the dorsal lithotomy position, anesthesia was induced with IV propofol by the nurse anesthesist    Bimanual exam was done with normal findings  The vulva and vagina were prepped with betadine solution    A Leep bivalve speculum with smoke evacuation tube was placed in the vagina  The cervix was identified  Acetic acid 5% was applied, Lugol's solution was then applied as well  Infiltration of the cervical stroma circumferentially with lidocaine 2% with epinephrine for a total of 5 ml was performed  The loop electrode with monopolar cutting was used to excise the affected area of the cervix  The specimen was handed over  Monopolar electro cautery was applied to the defect on the cervix for hemostasis  Monsel's solution was applied as well  Hemostasis was present  The instruments were removed  Davide Garcia was awakened of anesthesia  She tolerated the procedure well

## 2018-06-04 ENCOUNTER — OFFICE VISIT (OUTPATIENT)
Dept: OBGYN CLINIC | Facility: CLINIC | Age: 48
End: 2018-06-04

## 2018-06-04 VITALS — BODY MASS INDEX: 23.34 KG/M2 | WEIGHT: 144.6 LBS | DIASTOLIC BLOOD PRESSURE: 62 MMHG | SYSTOLIC BLOOD PRESSURE: 104 MMHG

## 2018-06-04 DIAGNOSIS — Z09 POSTOPERATIVE EXAMINATION: ICD-10-CM

## 2018-06-04 DIAGNOSIS — N87.1 MODERATE DYSPLASIA OF CERVIX (CIN II): Primary | ICD-10-CM

## 2018-06-04 PROCEDURE — 99024 POSTOP FOLLOW-UP VISIT: CPT | Performed by: OBSTETRICS & GYNECOLOGY

## 2018-06-05 ENCOUNTER — CLINICAL SUPPORT (OUTPATIENT)
Dept: FAMILY MEDICINE CLINIC | Facility: CLINIC | Age: 48
End: 2018-06-05
Payer: COMMERCIAL

## 2018-06-05 DIAGNOSIS — Z11.1 SCREENING-PULMONARY TB: Primary | ICD-10-CM

## 2018-06-05 DIAGNOSIS — A15.0 TB (PULMONARY TUBERCULOSIS): ICD-10-CM

## 2018-06-05 PROCEDURE — 86580 TB INTRADERMAL TEST: CPT

## 2018-07-20 DIAGNOSIS — Z30.09 BIRTH CONTROL COUNSELING: ICD-10-CM

## 2018-10-17 ENCOUNTER — TRANSCRIBE ORDERS (OUTPATIENT)
Dept: RADIOLOGY | Facility: CLINIC | Age: 48
End: 2018-10-17

## 2018-10-24 ENCOUNTER — HOSPITAL ENCOUNTER (OUTPATIENT)
Dept: RADIOLOGY | Age: 48
Discharge: HOME | End: 2018-10-24
Payer: COMMERCIAL

## 2018-10-24 DIAGNOSIS — Z12.31 ENCOUNTER FOR SCREENING MAMMOGRAM FOR MALIGNANT NEOPLASM OF BREAST: ICD-10-CM

## 2018-10-24 DIAGNOSIS — R92.2 DENSE BREAST TISSUE: ICD-10-CM

## 2018-10-24 PROCEDURE — 77067 SCR MAMMO BI INCL CAD: CPT

## 2018-10-24 PROCEDURE — 77063 BREAST TOMOSYNTHESIS BI: CPT

## 2018-11-15 ENCOUNTER — EVALUATION (OUTPATIENT)
Dept: PHYSICAL THERAPY | Facility: CLINIC | Age: 48
End: 2018-11-15
Payer: COMMERCIAL

## 2018-11-15 ENCOUNTER — OFFICE VISIT (OUTPATIENT)
Dept: FAMILY MEDICINE CLINIC | Facility: CLINIC | Age: 48
End: 2018-11-15
Payer: COMMERCIAL

## 2018-11-15 VITALS
WEIGHT: 151 LBS | SYSTOLIC BLOOD PRESSURE: 102 MMHG | OXYGEN SATURATION: 98 % | DIASTOLIC BLOOD PRESSURE: 62 MMHG | HEIGHT: 66 IN | BODY MASS INDEX: 24.27 KG/M2 | HEART RATE: 69 BPM

## 2018-11-15 DIAGNOSIS — M54.2 NECK PAIN: Primary | ICD-10-CM

## 2018-11-15 DIAGNOSIS — M62.838 MUSCLE SPASM: ICD-10-CM

## 2018-11-15 PROCEDURE — 1036F TOBACCO NON-USER: CPT | Performed by: FAMILY MEDICINE

## 2018-11-15 PROCEDURE — 99213 OFFICE O/P EST LOW 20 MIN: CPT | Performed by: FAMILY MEDICINE

## 2018-11-15 PROCEDURE — 97161 PT EVAL LOW COMPLEX 20 MIN: CPT | Performed by: PHYSICAL THERAPIST

## 2018-11-15 PROCEDURE — 3008F BODY MASS INDEX DOCD: CPT | Performed by: FAMILY MEDICINE

## 2018-11-15 PROCEDURE — G8990 OTHER PT/OT CURRENT STATUS: HCPCS | Performed by: PHYSICAL THERAPIST

## 2018-11-15 PROCEDURE — 97110 THERAPEUTIC EXERCISES: CPT | Performed by: PHYSICAL THERAPIST

## 2018-11-15 PROCEDURE — G8991 OTHER PT/OT GOAL STATUS: HCPCS | Performed by: PHYSICAL THERAPIST

## 2018-11-15 NOTE — PROGRESS NOTES
Subjective:      Patient ID: Rj De Leon is a 50 y o  female  Neck Pain    This is a chronic problem  The current episode started more than 1 month ago  The problem occurs every several days  The problem has been unchanged  The pain is associated with an unknown factor  The pain is present in the right side  The quality of the pain is described as aching  The pain is at a severity of 3/10  The pain is mild  The symptoms are aggravated by twisting  Stiffness is present all day  Pertinent negatives include no chest pain, fever, headaches, numbness, paresis, tingling, visual change or weakness  She has tried nothing for the symptoms  The treatment provided no relief         Past Medical History:   Diagnosis Date    Abnormal Pap smear of cervix     Acute sinusitis     Allergic rhinitis     Onset: [id number]    Dysplasia of cervix     MILD  LAST ASSESSED 8/18/2014    Head ache     HPV in female     LGSIL of cervix of undetermined significance     Low grade squamous intraepithelial lesion (LGSIL) on Papanicolaou smear of cervix     Resolved: 2014    Mild dysplasia of cervix (ELIANA I)     Last Assessed: [id number]    Mononucleosis     Resolved: 1992    Normal delivery     2003 son, 2005 daughter    Pyelonephritis     UTI (urinary tract infection)        Family History   Problem Relation Age of Onset    Depression Mother         Denied per Allscripts    Depression Father         Denied per Allscripts    Heart disease Maternal Grandmother     Hypertension Maternal Grandmother     Prostate cancer Maternal Grandfather     ALS Paternal Grandmother     Parkinsonism Paternal Grandfather     Arthritis Family     Cystic fibrosis Family         patient not a carrier    Other Family         Headache Syndromes    Down syndrome Family         nephew    Osteoporosis Family        Past Surgical History:   Procedure Laterality Date    COLPOSCOPY W/ BIOPSY / CURETTAGE  2014, 2018    LSIL    MAMMO STEREOTACTIC BREAST BIOPSY LEFT (ALL INC) Left 7/10/2017    MT EXC SKIN BENIG 1 1-2 CM REMAINDR BODY Right 12/6/2017    Procedure: EXCISION NODULE CERVICAL REGION;  Surgeon: Michelle Harp MD;  Location: [facility];  Service: General        reports that she has never smoked  She has never used smokeless tobacco  She reports that she drinks alcohol  She reports that she does not use drugs  Current Outpatient Prescriptions:     cholecalciferol (VITAMIN D3) 1,000 units tablet, Take 1,000 Units by mouth daily, Disp: , Rfl:     fexofenadine (ALLEGRA) 180 MG tablet, Take 1 tablet by mouth daily, Disp: , Rfl:     GIANVI 3-0 02 MG per tablet, TAKE 1 TABLET DAILY, Disp: 84 tablet, Rfl: 1    The following portions of the patient's history were reviewed and updated as appropriate: allergies, current medications, past family history, past medical history, past social history, past surgical history and problem list     Review of Systems   Constitutional: Negative for fever  Cardiovascular: Negative for chest pain  Musculoskeletal: Positive for neck pain and neck stiffness (Right sided)  Negative for arthralgias, joint swelling and myalgias  Neurological: Negative for tingling, weakness, numbness and headaches  Objective:    /62   Pulse 69   Ht 5' 6" (1 676 m)   Wt 68 5 kg (151 lb)   SpO2 98%   BMI 24 37 kg/m²      Physical Exam   Constitutional: She is oriented to person, place, and time  She appears well-developed and well-nourished  Cardiovascular: Normal rate, regular rhythm and normal heart sounds  Pulmonary/Chest: Effort normal and breath sounds normal    Abdominal: Soft  Bowel sounds are normal    Musculoskeletal:   Patient has difficulty with side to side neck movements  No bony tenderness  No shoulder pain, ROM- FULL  Neurological: She is alert and oriented to person, place, and time     Psychiatric: Her behavior is normal  Judgment normal          No results found for this or any previous visit (from the past 1008 hour(s))  Assessment/Plan:    Right sided neck pain, muscular  Patient advised OTC NSAID'S if symptoms worsen, offered muscle relaxer for intermittent use, but she declines  Wants to try physical therapy  Will fup if symptoms worsen  Patient is up-to-date on all her age-appropriate preventative screening and vaccines  Annual follow-up for physical advised  Diagnoses and all orders for this visit:    Neck pain  -     Ambulatory referral to Physical Therapy; Future    Muscle spasm  -     Ambulatory referral to Physical Therapy;  Future

## 2018-11-15 NOTE — PROGRESS NOTES
PT Evaluation     Today's date: 11/15/2018  Patient name: Sebastián Collado  : 1970  MRN: [id number]  Referring provider: Stephenie Raymond MD  Dx:   Encounter Diagnosis     ICD-10-CM    1  Neck pain M54 2    2  Muscle spasm M62 838        Start Time: 1137  Stop Time: 1230  Total time in clinic (min): 53 minutes    Assessment  Impairments: abnormal or restricted ROM, lacks appropriate home exercise program, pain with function and poor posture     Assessment details: Sebastián Collado is a 50 y o  female who presents to the clinic with signs and symptoms consistent with an insidious onset of neck pain  Patient has no complaints of radicular symptoms or headaches  The patient presents with the above listed impairments  She is eager to decrease her pain and increase her ROM  She should benefit from skilled physical therapy  Thank you for the referral       Understanding of Dx/Px/POC: good   Prognosis: good    Goals  Impairment Goals:  1  Patient will decrease complaints of pain to 3/10 at worst in 4 weeks  2  Patient will increase cervical lateral flexion and rotation bilaterally by 50% in 4 weeks    Functional Goals:  1  Patient will be independent with HEP by discharge  2  Patient will increase FOTO to at least a 76 by discharge  3  Patient will tolerate looking over her shoulder while driving in 4 weeks  4    Patient will tolerate looking to the floor with decreased complaints of pain in 4 weeks    Plan  Patient would benefit from: skilled physical therapy  Planned modality interventions: cryotherapy, TENS and thermotherapy: hydrocollator packs  Planned therapy interventions: activity modification, ADL training, flexibility, functional ROM exercises, graded activity, graded exercise, graded motor, home exercise program, therapeutic training, therapeutic exercise, therapeutic activities, stretching, strengthening, postural training, patient education, neuromuscular re-education, muscle pump exercises, motor coordination training, Allen taping, manual therapy, joint mobilization and massage  Frequency: 2x week  Duration in weeks: 4  Treatment plan discussed with: patient        Subjective Evaluation    History of Present Illness  Mechanism of injury: Patient reports several week history of neck pain which has not diminished  Patient reports insidious onset of pain with no decreasing sensation  Patient saw her PCP who referred her for physical therapy  She does not note any paresthesia into the right upper extremity, but does note that her right arm feels "a little different"  Pain Location:  R sided neck pain into upper trap  Occupation:   Teacher at [employer] Limitations:  No prior functional limitations, able to do yard work  AGG:  Turning head quickly, lateral movements, driving  Ease:  Stretching   Patient Goals:  "I want my neck to not hurt most of the time and some strategies to help"        Pain  Current pain rating: 3  At best pain rating: 3  At worst pain ratin  Quality: dull ache and sharp (Normally a dull ache but can get a sharp pain with quick movements )          Objective     Special Questions  Negative for night pain, disturbed sleep, dizziness, faints, headaches, nausea/motion sickness, tinnitus, trouble swallowing, difficulty breathing, shortness of breath, respiratory pain and visual change    Neurological Testing     Sensation     Shoulder   Left Shoulder   Intact: light touch    Right Shoulder   Intact: light touch    Additional Neurological Details  Neurovascular Intact    Active Range of Motion   Cervical/Thoracic Spine   Cervical    Flexion: 55 degrees with pain  Extension: 46 degrees with pain  Left lateral flexion: 11 degrees with pain  Right lateral flexion: 11 degrees with pain  Left rotation: 64 degrees   Right rotation: 41 degrees with pain  Left Shoulder   Normal active range of motion    Right Shoulder   Normal active range of motion    Strength/Myotome Testing     Left Shoulder   Normal muscle strength    Right Shoulder   Normal muscle strength    Tests   Cervical   Negative repeated extension and repeated flexion  Left   Negative cervical distraction and Spurling's sign  Right   Negative cervical distraction and Spurling's sign         Flowsheet Rows      Most Recent Value   PT/OT G-Codes   Current Score  65   Projected Score  76   FOTO information reviewed  Yes   Assessment Type  Evaluation   G code set  Other PT/OT Primary   Other PT Primary Current Status ()  CJ   Other PT Primary Goal Status ()  CH          Precautions:  NA    Manuals 11/15            PROM             Mobs                                       Exercise Diary              UBE                          Chin Tucks x10            Upper Trap Stretch x10            Cervical Rotation             Cervical Flexion             Scap Squeezes             Tband Rows             Tband Shoulder Extension                                                                                                                     Reviewed HEP RAT                                                                Modalities             CP PRN

## 2018-11-20 ENCOUNTER — OFFICE VISIT (OUTPATIENT)
Dept: PHYSICAL THERAPY | Facility: CLINIC | Age: 48
End: 2018-11-20
Payer: COMMERCIAL

## 2018-11-20 DIAGNOSIS — M62.838 MUSCLE SPASM: ICD-10-CM

## 2018-11-20 DIAGNOSIS — M54.2 NECK PAIN: Primary | ICD-10-CM

## 2018-11-20 PROCEDURE — 97110 THERAPEUTIC EXERCISES: CPT | Performed by: PHYSICAL THERAPIST

## 2018-11-20 PROCEDURE — 97140 MANUAL THERAPY 1/> REGIONS: CPT | Performed by: PHYSICAL THERAPIST

## 2018-11-20 NOTE — PROGRESS NOTES
Daily Note     Today's date: 2018  Patient name: Navya Wagoner  : 1970  MRN: [id number]  Referring provider: Nena Mcnally MD  Dx:   Encounter Diagnosis     ICD-10-CM    1  Neck pain M54 2    2  Muscle spasm M62 838        Start Time: 7880  Stop Time: 0832  Total time in clinic (min): 38 minutes    Subjective:  "I feel better  If I felt this way before I would have never come in "      Objective: See treatment diary below      Assessment: Tolerated treatment well  Patient would benefit from continued PT  Patient's cervical spine noted hypomobility with lateral glides  Patient with relief s/p manual therapy  Rotation seems to be improved  Progress ROM as able  Plan: Progress treatment as tolerated          Precautions:  NA    Manuals 11/15 11/20           PROM  Sub Occipital Release / R Rotation / R UT stretch           Mobs  Lateral glides to the L                                     Exercise Diary              UBE                          Chin Tucks x10 x10           Upper Trap Stretch x10            Cervical Rotation             Cervical Flexion             Scap Squeezes             Tband Rows  Green 3x10           Tband Shoulder Extension             SNAGS  x10                                                                                                      Reviewed HEP RAT                                                                Modalities             CP PRN

## 2018-11-21 ENCOUNTER — OFFICE VISIT (OUTPATIENT)
Dept: PHYSICAL THERAPY | Facility: CLINIC | Age: 48
End: 2018-11-21
Payer: COMMERCIAL

## 2018-11-21 ENCOUNTER — APPOINTMENT (OUTPATIENT)
Dept: PHYSICAL THERAPY | Facility: CLINIC | Age: 48
End: 2018-11-21
Payer: COMMERCIAL

## 2018-11-21 DIAGNOSIS — M62.838 MUSCLE SPASM: ICD-10-CM

## 2018-11-21 DIAGNOSIS — M54.2 NECK PAIN: Primary | ICD-10-CM

## 2018-11-21 PROCEDURE — 97112 NEUROMUSCULAR REEDUCATION: CPT

## 2018-11-21 PROCEDURE — 97110 THERAPEUTIC EXERCISES: CPT

## 2018-11-21 PROCEDURE — 97140 MANUAL THERAPY 1/> REGIONS: CPT

## 2018-11-21 NOTE — PROGRESS NOTES
Daily Note     Today's date: 2018  Patient name: Burton Mendez  : 1970  MRN: [id number]  Referring provider: Nelli Chahal MD  Dx:   Encounter Diagnosis     ICD-10-CM    1  Neck pain M54 2    2  Muscle spasm M62 838                   Subjective: Patient reports that she has been feeling a little better  She reports that, "I am sore today, but its not painful"  Objective: See treatment diary below      Assessment: Tolerated treatment well  Patient exhibited good technique with therapeutic exercises   Patient was able to perform all progressions today without increased pain or discomfort  Patient experienced decreased tightness and decreased soreness with performance of manual therapy  She was given an updated HEP, demonstrates understanding  Plan: Continue per plan of care         Precautions:  NA      Manual  11/15 11/20 11/21     PROM  Sub Occipital Release / R Rotation / R UT stretch Sub Occipital Release / R Rotation / R UT stretch -RO, PTA, RT, PT      Mobs  Lateral glides to the L- RT, PT                                 Exercise Diary                         Chin tucks  10x  10x  20x     Upper Trap Stretch  10x   10x 10 sec     Cervical Rotation   15x ea     Cervical flexion    15x      Scap Squeezes    20x      Theraband Rows  Green, 3x10  Green, 20x      Theraband ext    Green, 20x      SNAGS  10x 10x                                                                                        Modalities        CP PRN

## 2018-11-27 ENCOUNTER — OFFICE VISIT (OUTPATIENT)
Dept: PHYSICAL THERAPY | Facility: CLINIC | Age: 48
End: 2018-11-27
Payer: COMMERCIAL

## 2018-11-27 DIAGNOSIS — M54.2 NECK PAIN: Primary | ICD-10-CM

## 2018-11-27 DIAGNOSIS — M62.838 MUSCLE SPASM: ICD-10-CM

## 2018-11-27 PROCEDURE — 97140 MANUAL THERAPY 1/> REGIONS: CPT

## 2018-11-27 PROCEDURE — 97110 THERAPEUTIC EXERCISES: CPT

## 2018-11-27 NOTE — PROGRESS NOTES
Daily Note     Today's date: 2018  Patient name: Ravin Jimenez  : 1970  MRN: [id number]  Referring provider: Winnie Durham MD  Dx:   Encounter Diagnosis     ICD-10-CM    1  Neck pain M54 2    2  Muscle spasm M62 838                   Subjective: Patient reports that over the past couple she has had increased stiffness, tightness and soreness in her neck  She reports that she had to do a lot of baking which contributed to the soreness  She reports that she also noticed increased tightness in her neck after being outside in the cold weather for prolonged periods of time  Objective: See treatment diary below      Assessment: Tolerated treatment well  Patient exhibited good technique with therapeutic exercises  She was able to perform all progressions without increased pain  Significantly improved patient's pain and tightness with manuals  Verbal and tactile cuing needed to perform bent over scap squeezes without compensation; improved mechanics with cuing  Plan: Continue per plan of care         Precautions:  NA      Manual  11/15 11/20 11/21 11/27    PROM  Sub Occipital Release / R Rotation / R UT stretch Sub Occipital Release / R Rotation / R UT stretch -RO, PTA, RT, PT  Bilateral UT stretch- RO, PTA     Mobs  Lateral glides to the L- RT, PT       STM     To right scalenes, UT and levator                      Exercise Diary                         Chin tucks  10x  10x  20x 20x     Upper Trap Stretch  10x   10x 10 sec 10x 10 sec     Cervical Rotation   15x ea     Cervical flexion    15x      Scap Squeezes    20x  20x     Theraband Rows  Green, 3x10  Green, 20x  Green, 20x     Theraband ext    Green, 20x  Green, 20x     SNAGS  10x 10x  10x ea    Stabilizer     10x 5 sec hold     Bent over scap squeeze with T's, Y's and I's with chin tucks     Scap squeeze only 15x ea                                                                      Modalities        CP PRN

## 2018-11-29 ENCOUNTER — OFFICE VISIT (OUTPATIENT)
Dept: PHYSICAL THERAPY | Facility: CLINIC | Age: 48
End: 2018-11-29
Payer: COMMERCIAL

## 2018-11-29 DIAGNOSIS — M62.838 MUSCLE SPASM: ICD-10-CM

## 2018-11-29 DIAGNOSIS — M54.2 NECK PAIN: Primary | ICD-10-CM

## 2018-11-29 PROCEDURE — 97110 THERAPEUTIC EXERCISES: CPT | Performed by: PHYSICAL THERAPIST

## 2018-11-29 PROCEDURE — 97140 MANUAL THERAPY 1/> REGIONS: CPT | Performed by: PHYSICAL THERAPIST

## 2018-11-29 NOTE — PROGRESS NOTES
Daily Note     Today's date: 2018  Patient name: Agnieszka Ballard  : 1970  MRN: [id number]  Referring provider: Nhung Zabala MD  Dx:   Encounter Diagnosis     ICD-10-CM    1  Neck pain M54 2    2  Muscle spasm M62 838        Start Time: 1119  Stop Time: 1203  Total time in clinic (min): 44 minutes    Subjective: "It's definitely getting better  I actually feel it on my left a little bit more today  Maybe it's evening out "      Objective: See treatment diary below      Assessment: Tolerated treatment well  Patient would benefit from continued PT  Patient with use of UT in all movements  Relief during cervical rotation depression of upper traps  Patient demonstrated good form with scapular setting in prone  Verbal cues necessary for initial setting, but patient with good carryover  Continue to progress DNF strengthening  Plan: Progress treatment as tolerated        Precautions:  NA      Manual      PROM  Sub Occipital Release / R Rotation / R UT stretch Sub Occipital Release / R Rotation / R UT stretch -RO, PTA, RT, PT  Bilateral UT stretch- RO, PTA  Bilateral UT stretch,    Mobs  Lateral glides to the L- RT, PT    Cervical & Thoracic PA Mobs Grade III   STM     To right scalenes, UT and levator                      Exercise Diary                         Chin tucks   10x  20x 20x     Upper Trap Stretch    10x 10 sec 10x 10 sec     Cervical Rotation   15x ea  10x ea   Cervical flexion    15x      Scap Squeezes    20x  20x  Prone scap setting x 10   Theraband Rows  Green, 3x10  Green, 20x  Green, 20x  Green 20x w/ cues for no UT    Theraband ext    Green, 20x  Green, 20x     SNAGS  10x 10x  10x ea    Stabilizer     10x 5 sec hold     Bent over scap squeeze with T's, Y's and I's with chin tucks     Scap squeeze only 15x ea    Theracane      5 min total                                                             Modalities        CP PRN

## 2018-12-04 ENCOUNTER — OFFICE VISIT (OUTPATIENT)
Dept: PHYSICAL THERAPY | Facility: CLINIC | Age: 48
End: 2018-12-04
Payer: COMMERCIAL

## 2018-12-04 DIAGNOSIS — M62.838 MUSCLE SPASM: ICD-10-CM

## 2018-12-04 DIAGNOSIS — M54.2 NECK PAIN: Primary | ICD-10-CM

## 2018-12-04 PROCEDURE — 97140 MANUAL THERAPY 1/> REGIONS: CPT | Performed by: PHYSICAL THERAPIST

## 2018-12-04 PROCEDURE — 97110 THERAPEUTIC EXERCISES: CPT | Performed by: PHYSICAL THERAPIST

## 2018-12-04 NOTE — PROGRESS NOTES
Daily Note     Today's date: 2018  Patient name: Ese Cordon  : 1970  MRN: [id number]  Referring provider: Arnoldo Ascencio MD  Dx:   Encounter Diagnosis     ICD-10-CM    1  Neck pain M54 2    2  Muscle spasm M62 838        Start Time: 0750  Stop Time: 08  Total time in clinic (min): 39 minutes    Subjective:  "Today is the best day that I've had  I've had less pain today "      Objective: See treatment diary below      Assessment: Tolerated treatment well  Patient would benefit from continued PT  Patient with relief during first rib mobs and cervical/thoracic PA mobs  Patient able to demonstrate increased ROM s/p manual therapy  Patient encouraged to keep up HjEP for further results  Plan: Progress treatment as tolerated          Precautions:  NA      Manual     PROM Sub Occipital Release / OA Rotation R and L /   Sub Occipital Release / R Rotation / R UT stretch -RO, PTA, RT, PT  Bilateral UT stretch- RO, PTA  Bilateral UT stretch,    Mobs 1st rib Grade IV / Cervical & Thoracic Mobs Grade III / IV    Cervical & Thoracic PA Mobs Grade III   STM     To right scalenes, UT and levator                      Exercise Diary                         Chin tucks  10x  20x 20x     Upper Trap Stretch    10x 10 sec 10x 10 sec     Cervical Rotation 10x ea  15x ea  10x ea   Cervical flexion    15x      Scap Squeezes    20x  20x  Prone scap setting x 10   Theraband Rows Blue 20x  Green, 20x  Green, 20x  Green 20x w/ cues for no UT    Theraband ext  Blue 20x  Green, 20x  Green, 20x     SNAGS   10x  10x ea    Stabilizer     10x 5 sec hold     Bent over scap squeeze with T's, Y's and I's with chin tucks     Scap squeeze only 15x ea    Theracane      5 min total   Touchdowns x10 5 sec hold                                                         Modalities        CP PRN

## 2018-12-06 ENCOUNTER — APPOINTMENT (OUTPATIENT)
Dept: PHYSICAL THERAPY | Facility: CLINIC | Age: 48
End: 2018-12-06
Payer: COMMERCIAL

## 2018-12-07 ENCOUNTER — OFFICE VISIT (OUTPATIENT)
Dept: PHYSICAL THERAPY | Facility: CLINIC | Age: 48
End: 2018-12-07
Payer: COMMERCIAL

## 2018-12-07 DIAGNOSIS — M54.2 NECK PAIN: Primary | ICD-10-CM

## 2018-12-07 DIAGNOSIS — M62.838 MUSCLE SPASM: ICD-10-CM

## 2018-12-07 PROCEDURE — 97110 THERAPEUTIC EXERCISES: CPT

## 2018-12-07 PROCEDURE — 97112 NEUROMUSCULAR REEDUCATION: CPT

## 2018-12-07 PROCEDURE — 97140 MANUAL THERAPY 1/> REGIONS: CPT

## 2018-12-07 NOTE — PROGRESS NOTES
Daily Note     Today's date: 2018  Patient name: Bandar Flores  : 1970  MRN: [id number]  Referring provider: Tim Mclean MD  Dx:   Encounter Diagnosis     ICD-10-CM    1  Neck pain M54 2    2  Muscle spasm M62 838                   Subjective: Patient reports, "I woke up with stiffness in my neck, but no pain  The spot that is typically painful is not bad at all"  She reports, "my shoulders feel tight and stiff"  Objective: See treatment diary below      Assessment: Tolerated treatment well  Patient exhibited good technique with therapeutic exercises  She performed all outlined tasks without increased pain  Tenderness to palpation noted along right UT and scalenes; this was improved with manuals  Patient noted significant improvement of motion and stiffness with manuals  Plan: Continue per plan of care       Precautions:  NA      Manual  18   PROM Sub Occipital Release / OA Rotation R and L /  SOR, STM to Right UT and scalenes - RO, PTA   Bilateral UT stretch- RO, PTA  Bilateral UT stretch,    Mobs 1st rib Grade IV / Cervical & Thoracic Mobs Grade III / IV 1st rib Grade IV / Cervical Mobs Grade III- RT, PT    Cervical & Thoracic PA Mobs Grade III   STM     To right scalenes, UT and levator                      Exercise Diary                         Chin tucks  10x 15x   20x     Upper Trap Stretch     10x 10 sec     Cervical Rotation 10x ea 10x ea   10x ea   Cervical flexion         Scap Squeezes     20x  Prone scap setting x 10   Theraband Rows Blue 20x Blue 20x  Green, 20x  Green 20x w/ cues for no UT    Theraband ext  Blue 20x Blue 20x  Green, 20x     SNAGS  10x ea  10x ea    Stabilizer     10x 5 sec hold     Bent over scap squeeze with T's, Y's and I's with chin tucks     Scap squeeze only 15x ea    Theracane   HEP    5 min total   Touchdowns x10 5 sec hold 15x 5 sec                                                         Modalities        CP PRN

## 2018-12-10 ENCOUNTER — OFFICE VISIT (OUTPATIENT)
Dept: PHYSICAL THERAPY | Facility: CLINIC | Age: 48
End: 2018-12-10
Payer: COMMERCIAL

## 2018-12-10 DIAGNOSIS — M54.2 NECK PAIN: Primary | ICD-10-CM

## 2018-12-10 DIAGNOSIS — M62.838 MUSCLE SPASM: ICD-10-CM

## 2018-12-10 PROCEDURE — 97110 THERAPEUTIC EXERCISES: CPT | Performed by: PHYSICAL THERAPIST

## 2018-12-10 PROCEDURE — 97140 MANUAL THERAPY 1/> REGIONS: CPT | Performed by: PHYSICAL THERAPIST

## 2018-12-10 NOTE — PROGRESS NOTES
Daily Note     Today's date: 12/10/2018  Patient name: Jaimie Krueger  : 1970  MRN: [id number]  Referring provider: Sydney Mohs, MD  Dx:   Encounter Diagnosis     ICD-10-CM    1  Neck pain M54 2    2  Muscle spasm M62 838        Start Time: 802  Stop Time:   Total time in clinic (min): 42 minutes    Subjective: "It feels OK  It felt really good Saturday but it's just OK today  I might have slept wrong or I could be getting a little stressed since my parents are flying up for 2 weeks this week "      Objective: See treatment diary below      Assessment: Tolerated treatment well  Patient would benefit from continued PT  Patient with increased pain complaints today that were reduced with manual therapy and exercise  Patient to be re-evaluated at next visit  Still with some slight limitations with cervical rotation to the right and with side-bending  Plan: Progress treatment as tolerated          Precautions:  NA      Manual  12/4 12/7/18  12/10/18  11/29   PROM Sub Occipital Release / OA Rotation R and L /  SOR, STM to Right UT and scalenes - RO, PTA  SOR, STM to Right UT and scalenes - RT, PT  Bilateral UT stretch,    Mobs 1st rib Grade IV / Cervical & Thoracic Mobs Grade III / IV 1st rib Grade IV / Cervical Mobs Grade III- RT, PT  1st rib Grade IV / Cervical & Thoracic Grade III RT, PT  Cervical & Thoracic PA Mobs Grade III   STM                           Exercise Diary                         Chin tucks  10x 15x  10x     Upper Trap Stretch         Cervical Rotation 10x ea 10x ea 10x ea  10x ea   Cervical flexion    10x     Scap Squeezes      Prone scap setting x 10   Theraband Rows Blue 20x Blue 20x Black 20x  Green 20x w/ cues for no UT    Theraband ext  Blue 20x Blue 20x Black 20x     SNAGS  10x ea      Stabilizer         Bent over scap squeeze with T's, Y's and I's with chin tucks         Theracane   HEP    5 min total   Touchdowns x10 5 sec hold 15x 5 sec  15x 5 sec Modalities        CP PRN

## 2018-12-11 ENCOUNTER — OFFICE VISIT (OUTPATIENT)
Dept: OBGYN CLINIC | Facility: CLINIC | Age: 48
End: 2018-12-11
Payer: COMMERCIAL

## 2018-12-11 VITALS — BODY MASS INDEX: 23.89 KG/M2 | SYSTOLIC BLOOD PRESSURE: 116 MMHG | DIASTOLIC BLOOD PRESSURE: 70 MMHG | WEIGHT: 148 LBS

## 2018-12-11 DIAGNOSIS — N87.1 DYSPLASIA OF CERVIX, HIGH GRADE CIN 2: Primary | ICD-10-CM

## 2018-12-11 PROCEDURE — G0145 SCR C/V CYTO,THINLAYER,RESCR: HCPCS | Performed by: OBSTETRICS & GYNECOLOGY

## 2018-12-11 PROCEDURE — 87624 HPV HI-RISK TYP POOLED RSLT: CPT | Performed by: OBSTETRICS & GYNECOLOGY

## 2018-12-11 PROCEDURE — 99212 OFFICE O/P EST SF 10 MIN: CPT | Performed by: OBSTETRICS & GYNECOLOGY

## 2018-12-11 NOTE — PROGRESS NOTES
Gabriella FLETCHER [facility]  1970    S:  50 y o  female here for a scheduled 6 month repap as recommended by Dr Papa Claros      Past Medical History:   Diagnosis Date    Abnormal Pap smear of cervix     Acute sinusitis     Allergic rhinitis     Onset: [id number]    Dysplasia of cervix     MILD  LAST ASSESSED 8/18/2014    Head ache     HPV in female     LGSIL of cervix of undetermined significance     Low grade squamous intraepithelial lesion (LGSIL) on Papanicolaou smear of cervix     Resolved: 2014    Mild dysplasia of cervix (ELIANA I)     Last Assessed: [id number]    Mononucleosis     Resolved: 1992    Normal delivery     2003 son, 2005 daughter    Pyelonephritis     UTI (urinary tract infection)      Family History   Problem Relation Age of Onset    Depression Mother         Denied per [employer]    Depression Father         Denied per [employer]    Heart disease Maternal Grandmother     Hypertension Maternal Grandmother     Prostate cancer Maternal Grandfather     ALS Paternal Grandmother     Parkinsonism Paternal Grandfather     Arthritis Family     Cystic fibrosis Family         patient not a carrier    Other Family         Headache Syndromes    Down syndrome Family         nephew    Osteoporosis Family      Social History     Social History    Marital status:      Spouse name: N/A    Number of children: N/A    Years of education: N/A     Occupational History    Professor-Civil Engineering      Social History Main Topics    Smoking status: Never Smoker    Smokeless tobacco: Never Used    Alcohol use Yes      Comment: Socially    Drug use: No    Sexual activity: Yes     Partners: Male     Birth control/ protection: Pill     Other Topics Concern    Not on file     Social History Narrative    Daily Coffee [place]: 2 cups/day    Exercising regularly           O:  /70 (BP Location: Right arm, Patient Position: Sitting, Cuff Size: Standard)   Wt 67 1 kg (148 lb) LMP 11/07/2018 (Exact Date)   BMI 23 89 kg/m²   She appears well and is in no distress  Abdomen is soft and nontender  External genitals are normal without lesions or rashes  Vagina is normal, no discharge or bleeding noted  Cervix is normal, no lesions or discharge  Pap collected    A/P:  Follow up for yearly exam

## 2018-12-12 LAB
HPV HR 12 DNA CVX QL NAA+PROBE: NEGATIVE
HPV16 DNA CVX QL NAA+PROBE: NEGATIVE
HPV18 DNA CVX QL NAA+PROBE: NEGATIVE

## 2018-12-13 ENCOUNTER — EVALUATION (OUTPATIENT)
Dept: PHYSICAL THERAPY | Facility: CLINIC | Age: 48
End: 2018-12-13
Payer: COMMERCIAL

## 2018-12-13 DIAGNOSIS — M62.838 MUSCLE SPASM: ICD-10-CM

## 2018-12-13 DIAGNOSIS — M54.2 NECK PAIN: Primary | ICD-10-CM

## 2018-12-13 LAB
LAB AP GYN PRIMARY INTERPRETATION: NORMAL
LAB AP LMP: NORMAL
Lab: NORMAL

## 2018-12-13 PROCEDURE — 97112 NEUROMUSCULAR REEDUCATION: CPT | Performed by: PHYSICAL THERAPIST

## 2018-12-13 PROCEDURE — G8990 OTHER PT/OT CURRENT STATUS: HCPCS | Performed by: PHYSICAL THERAPIST

## 2018-12-13 PROCEDURE — G8991 OTHER PT/OT GOAL STATUS: HCPCS | Performed by: PHYSICAL THERAPIST

## 2018-12-13 PROCEDURE — 97140 MANUAL THERAPY 1/> REGIONS: CPT | Performed by: PHYSICAL THERAPIST

## 2018-12-13 NOTE — PROGRESS NOTES
PT Re-Evaluation     Today's date: 2018  Patient name: Andrea Huggins  : 1970  MRN: [id number]  Referring provider: Leona Hampton MD  Dx:   Encounter Diagnosis     ICD-10-CM    1  Neck pain M54 2    2  Muscle spasm M62 838        Start Time: 0900  Stop Time: 0940  Total time in clinic (min): 40 minutes    Assessment  Assessment details: Andrea Huggins is a 50 y o  female who has been consistent with attending and participating in skilled physical therapy sessions  She has seen increases in her cervical ROM as well as decreases in her pain  She is still lacking full ROM and still has slight pain and stiffness  She will continue to benefit from further skilled physical therapy visits  Impairments: abnormal or restricted ROM, lacks appropriate home exercise program, pain with function and poor posture   Understanding of Dx/Px/POC: good   Prognosis: good    Goals  Impairment Goals:  1  Patient will decrease complaints of pain to 3/10 at worst in 4 weeks - PARTIALLY MET  2  Patient will increase cervical lateral flexion and rotation bilaterally by 50% in 4 weeks - PARTIALLY MET     Functional Goals:  1  Patient will be independent with HEP by discharge - PARTIALLY MET   2  Patient will increase FOTO to at least a 76 by discharge - PARTIALLY MET  3  Patient will tolerate looking over her shoulder while driving in 4 weeks - PARTIALLY MET  4    Patient will tolerate looking to the floor with decreased complaints of pain in 4 weeks - PARTIALLY MET     Plan  Patient would benefit from: skilled physical therapy  Planned modality interventions: cryotherapy, TENS and thermotherapy: hydrocollator packs  Planned therapy interventions: activity modification, ADL training, flexibility, functional ROM exercises, graded activity, graded exercise, graded motor, home exercise program, therapeutic training, therapeutic exercise, therapeutic activities, stretching, strengthening, postural training, patient education, neuromuscular re-education, muscle pump exercises, motor coordination training, Allen taping, manual therapy, joint mobilization and massage  Frequency: 1x week  Duration in weeks: 1  Treatment plan discussed with: patient        Subjective Evaluation    History of Present Illness  Mechanism of injury: Patient reports that she has improved "75%" since starring physical therapy  She notes improvement with rotating her head and "looking in all directions"  She notes that "I still don't have full motion in my neck, but I don't have these sharp pains "  She reports that her pain is "not intruding with my daily activities"  Pain Location:  R sided neck pain into upper trap  Occupation:   Teacher at [employer] Limitations:  No prior functional limitations, able to do yard work  AGG:  Turning head quickly, lateral movements, driving  Ease:  Stretching   Patient Goals:  "I want my neck to not hurt most of the time and some strategies to help"        Pain  Current pain ratin  At best pain ratin  At worst pain ratin  Quality: tight          Objective     Special Questions  Negative for night pain, disturbed sleep, dizziness, faints, headaches, nausea/motion sickness, tinnitus, trouble swallowing, difficulty breathing, shortness of breath, respiratory pain and visual change    Neurological Testing     Sensation     Shoulder   Left Shoulder   Intact: light touch    Right Shoulder   Intact: light touch    Additional Neurological Details  Neurovascular Intact    Active Range of Motion   Cervical/Thoracic Spine   Cervical    Flexion: 52 degrees   Extension: 48 degrees   Left lateral flexion: 19 degrees   Right lateral flexion: 12 degrees with pain  Left rotation: 70 degrees   Right rotation: 60 degrees with pain  Left Shoulder   Normal active range of motion    Right Shoulder   Normal active range of motion    Strength/Myotome Testing     Left Shoulder   Normal muscle strength    Right Shoulder   Normal muscle strength    Tests   Cervical   Negative repeated extension and repeated flexion  Left   Negative cervical distraction and Spurling's sign  Right   Negative cervical distraction and Spurling's sign         Flowsheet Rows      Most Recent Value   PT/OT G-Codes   Current Score  72   Projected Score  76   FOTO information reviewed  Yes   Assessment Type  Re-evaluation   G code set  Other PT/OT Primary   Other PT Primary Current Status ()  CJ   Other PT Primary Goal Status ()  CH            Precautions:  NA      Manual  12/4 12/7/18  12/10/18 12/13    PROM Sub Occipital Release / OA Rotation R and L /  SOR, STM to Right UT and scalenes - RO, PTA  SOR, STM to Right UT and scalenes - RT, PT SOR, STM to Right UT and scalenes - RT, PT    Mobs 1st rib Grade IV / Cervical & Thoracic Mobs Grade III / IV 1st rib Grade IV / Cervical Mobs Grade III- RT, PT  1st rib Grade IV / Cervical & Thoracic Grade III RT, PT 1st rib Grade IV / Cervical Lateral Glides, RT PT    STM                           Exercise Diary                         Chin tucks  10x 15x  10x     Upper Trap Stretch         Cervical Rotation 10x ea 10x ea 10x ea     Cervical flexion    10x     Scap Squeezes         Theraband Rows Blue 20x Blue 20x Black 20x     Theraband ext  Blue 20x Blue 20x Black 20x     SNAGS  10x ea      Stabilizer         Bent over scap squeeze with T's, Y's and I's with chin tucks         Theracane   HEP       Touchdowns x10 5 sec hold 15x 5 sec  15x 5 sec                     Review of FOTO / Re-Evaluation    RT, PT                              Modalities        CP PRN

## 2018-12-20 ENCOUNTER — EVALUATION (OUTPATIENT)
Dept: PHYSICAL THERAPY | Facility: CLINIC | Age: 48
End: 2018-12-20
Payer: COMMERCIAL

## 2018-12-20 DIAGNOSIS — M54.2 NECK PAIN: Primary | ICD-10-CM

## 2018-12-20 DIAGNOSIS — M62.838 MUSCLE SPASM: ICD-10-CM

## 2018-12-20 PROCEDURE — 97112 NEUROMUSCULAR REEDUCATION: CPT | Performed by: PHYSICAL THERAPIST

## 2018-12-20 PROCEDURE — G8991 OTHER PT/OT GOAL STATUS: HCPCS | Performed by: PHYSICAL THERAPIST

## 2018-12-20 PROCEDURE — 97110 THERAPEUTIC EXERCISES: CPT | Performed by: PHYSICAL THERAPIST

## 2018-12-20 PROCEDURE — G8992 OTHER PT/OT  D/C STATUS: HCPCS | Performed by: PHYSICAL THERAPIST

## 2018-12-20 NOTE — PROGRESS NOTES
PT Discharge    Today's date: 2018  Patient name: Martinez Shaffer  : 1970  MRN: [id number]  Referring provider: Vinny Amado MD  Dx:   Encounter Diagnosis     ICD-10-CM    1  Neck pain M54 2    2  Muscle spasm M62 838        Start Time: 908  Stop Time: 932  Total time in clinic (min): 24 minutes    Assessment  Assessment details: Martinez Shaffer is a 50 y o  female who has been consistent with attending and participating in skilled physical therapy sessions  Patient has done well with her HEP and managing symptoms on her own  She has been able to report decreased pain, increased ROM, and increased functional activity  The patient has verbally agreed with and understands her discharge from physical therapy  Due to patient being independent with her HEP and reaching her goals, patient will be discharged from physical therapy services  Impairments: poor posture   Understanding of Dx/Px/POC: good   Prognosis: good    Goals  Impairment Goals:  1  Patient will decrease complaints of pain to 3/10 at worst in 4 weeks - PARTIALLY MET  2  Patient will increase cervical lateral flexion and rotation bilaterally by 50% in 4 weeks - MET     Functional Goals:  1  Patient will be independent with HEP by discharge - PARTIALLY MET   2  Patient will increase FOTO to at least a 76 by discharge - PARTIALLY MET  3  Patient will tolerate looking over her shoulder while driving in 4 weeks - MET  4    Patient will tolerate looking to the floor with decreased complaints of pain in 4 weeks - MET     Plan  Patient would benefit from: skilled physical therapy  Planned therapy interventions: home exercise program, therapeutic exercise, postural training, patient education and neuromuscular re-education  Treatment plan discussed with: patient        Subjective Evaluation    History of Present Illness  Mechanism of injury: Patient reports that she has improved "80-85%" improved since starting therapy  She notes having less pain in the morning and overall increased mobility  She reports that she still has some pain when looking over her shoulder, but that it doesn't limit her motion  Pain Location:  R sided neck pain into upper trap  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:  No prior functional limitations, able to do yard work  AGG:  NA  Ease:  Stretching   Patient Goals:  "I want my neck to not hurt most of the time and some strategies to help"  Pain  Current pain ratin  At best pain ratin  At worst pain ratin  Quality: tight          Objective     Special Questions  Negative for night pain, disturbed sleep, dizziness, faints, headaches, nausea/motion sickness, tinnitus, trouble swallowing, difficulty breathing, shortness of breath, respiratory pain and visual change    Neurological Testing     Sensation     Shoulder   Left Shoulder   Intact: light touch    Right Shoulder   Intact: light touch    Additional Neurological Details  Neurovascular Intact    Active Range of Motion   Cervical/Thoracic Spine   Cervical    Flexion: 50 degrees   Extension: 50 degrees   Left lateral flexion: 24 degrees   Right lateral flexion: 25 degrees   Left rotation: 78 degrees   Right rotation: 71 degrees   Left Shoulder   Normal active range of motion    Right Shoulder   Normal active range of motion    Additional Active Range of Motion Details  Patient able to decrease pain and achieve increased lateral flexion with self mobilization    Strength/Myotome Testing     Left Shoulder   Normal muscle strength    Right Shoulder   Normal muscle strength    Tests   Cervical   Negative repeated extension and repeated flexion  Left   Negative cervical distraction and Spurling's sign  Right   Negative cervical distraction and Spurling's sign         Flowsheet Rows      Most Recent Value   PT/OT G-Codes   Current Score  77   Projected Score  76   FOTO information reviewed  Yes   Assessment Type  Discharge   G code set  Other PT/OT Primary   Other PT Primary Goal Status ()  [address]   Other PT Primary Discharge Status ()  CJ            Precautions:  NA      Manual  12/4 12/7/18  12/10/18 12/13 12/20   PROM Sub Occipital Release / OA Rotation R and L /  SOR, STM to Right UT and scalenes - RO, PTA  SOR, STM to Right UT and scalenes - RT, PT SOR, STM to Right UT and scalenes - RT, PT    Mobs 1st rib Grade IV / Cervical & Thoracic Mobs Grade III / IV 1st rib Grade IV / Cervical Mobs Grade III- RT, PT  1st rib Grade IV / Cervical & Thoracic Grade III RT, PT 1st rib Grade IV / Cervical Lateral Glides, RT PT    STM                           Exercise Diary                         Chin tucks  10x 15x  10x     Upper Trap Stretch         Cervical Rotation 10x ea 10x ea 10x ea     Cervical flexion    10x     Scap Squeezes         Theraband Rows Blue 20x Blue 20x Black 20x     Theraband ext  Blue 20x Blue 20x Black 20x     SNAGS  10x ea      Stabilizer         Bent over scap squeeze with T's, Y's and I's with chin tucks         Theracane   HEP       Touchdowns x10 5 sec hold 15x 5 sec  15x 5 sec                     Review of FOTO / Re-Evaluation    RT, PT    Review of FOTO / Discharge     RT, PT   Review of HEP     RT, PT   Patient Education     Self-Mobs     Modalities        CP PRN

## 2019-01-11 ENCOUNTER — OFFICE VISIT (OUTPATIENT)
Dept: FAMILY MEDICINE CLINIC | Facility: CLINIC | Age: 49
End: 2019-01-11
Payer: COMMERCIAL

## 2019-01-11 VITALS
WEIGHT: 150 LBS | HEART RATE: 66 BPM | HEIGHT: 66 IN | BODY MASS INDEX: 24.11 KG/M2 | DIASTOLIC BLOOD PRESSURE: 72 MMHG | TEMPERATURE: 98.8 F | RESPIRATION RATE: 18 BRPM | SYSTOLIC BLOOD PRESSURE: 110 MMHG | OXYGEN SATURATION: 98 %

## 2019-01-11 DIAGNOSIS — H10.13 ALLERGIC CONJUNCTIVITIS OF BOTH EYES: Primary | ICD-10-CM

## 2019-01-11 PROCEDURE — 3008F BODY MASS INDEX DOCD: CPT | Performed by: FAMILY MEDICINE

## 2019-01-11 PROCEDURE — 1036F TOBACCO NON-USER: CPT | Performed by: FAMILY MEDICINE

## 2019-01-11 PROCEDURE — 99213 OFFICE O/P EST LOW 20 MIN: CPT | Performed by: FAMILY MEDICINE

## 2019-01-11 NOTE — PROGRESS NOTES
Assessment/Plan:     Diagnoses and all orders for this visit:    Allergic conjunctivitis of both eyes  watery discharge, no eye matting or preauricular lyphadenopathy  - Directed to use warm compress for symptom relief  - Recommended frequent handwashing, wash pillow case  - Directed Pt to call back if continues despite antibiotic for 2-3 days, blurry vision, photophobia, fever over 102, severe HA        Discussed case with Dr Justin Abraham    Subjective:    Patient ID: Karina Anderson is a 50 y o  female  Pt is presenting today for Bilateral red and itchy eyes with a watery discharge which started yesterday morning  2 days ago she had makeover at [employer] and had makeup applied to her face  She wears Daily disposable contacts and discarded them  Itchiness is worse on left eye  She denies any eye matting    She has been wearing glasses  She has multiple seasonal allergies and takes Allegra daily all year round  Conjunctivitis    Associated symptoms include eye itching and eye redness  Pertinent negatives include no fever, no decreased vision, no double vision, no photophobia, no swollen glands and no eye discharge  The following portions of the patient's history were reviewed and updated as appropriate: allergies, current medications, past social history and problem list     Review of Systems   Constitutional: Negative for fever  Eyes: Positive for redness and itching  Negative for double vision, photophobia and discharge  Objective:  /72   Pulse 66   Temp 98 8 °F (37 1 °C)   Resp 18   Ht 5' 6" (1 676 m)   Wt 68 kg (150 lb)   SpO2 98%   BMI 24 21 kg/m²      Physical Exam   Constitutional: She is oriented to person, place, and time  She appears well-developed and well-nourished  No distress  HENT:   Head: Normocephalic and atraumatic  Eyes: Pupils are equal, round, and reactive to light  EOM are normal  Right eye exhibits no discharge   Left eye exhibits no discharge  Right conjunctiva is injected (slight)  Left conjunctiva is injected (slight)  Neck: Normal range of motion  Neck supple  Cardiovascular: Normal rate, regular rhythm, normal heart sounds and intact distal pulses  Exam reveals no gallop and no friction rub  No murmur heard  Pulmonary/Chest: Effort normal and breath sounds normal  No respiratory distress  She has no wheezes  She has no rales  Lymphadenopathy:     She has no cervical adenopathy  Neurological: She is alert and oriented to person, place, and time  Skin: Skin is warm and dry  She is not diaphoretic  Psychiatric: She has a normal mood and affect  Her behavior is normal  Thought content normal    Vitals reviewed

## 2019-01-24 DIAGNOSIS — Z30.09 BIRTH CONTROL COUNSELING: Primary | ICD-10-CM

## 2019-01-24 RX ORDER — DROSPIRENONE AND ETHINYL ESTRADIOL 0.02-3(28)
1 KIT ORAL DAILY
Qty: 84 TABLET | Refills: 2 | Status: SHIPPED | OUTPATIENT
Start: 2019-01-24 | End: 2019-07-16 | Stop reason: SDUPTHER

## 2019-07-16 ENCOUNTER — ANNUAL EXAM (OUTPATIENT)
Dept: OBGYN CLINIC | Facility: CLINIC | Age: 49
End: 2019-07-16
Payer: COMMERCIAL

## 2019-07-16 VITALS
DIASTOLIC BLOOD PRESSURE: 74 MMHG | SYSTOLIC BLOOD PRESSURE: 100 MMHG | WEIGHT: 155 LBS | BODY MASS INDEX: 24.91 KG/M2 | HEIGHT: 66 IN

## 2019-07-16 DIAGNOSIS — Z12.31 ENCOUNTER FOR SCREENING MAMMOGRAM FOR MALIGNANT NEOPLASM OF BREAST: ICD-10-CM

## 2019-07-16 DIAGNOSIS — Z01.419 ENCNTR FOR GYN EXAM (GENERAL) (ROUTINE) W/O ABN FINDINGS: Primary | ICD-10-CM

## 2019-07-16 DIAGNOSIS — Z11.51 SCREENING FOR HUMAN PAPILLOMAVIRUS (HPV): ICD-10-CM

## 2019-07-16 DIAGNOSIS — Z30.09 BIRTH CONTROL COUNSELING: ICD-10-CM

## 2019-07-16 DIAGNOSIS — N87.1 DYSPLASIA OF CERVIX, HIGH GRADE CIN 2: ICD-10-CM

## 2019-07-16 PROBLEM — R87.610 ASCUS WITH POSITIVE HIGH RISK HPV CERVICAL: Status: RESOLVED | Noted: 2018-03-07 | Resolved: 2019-07-16

## 2019-07-16 PROBLEM — R87.810 ASCUS WITH POSITIVE HIGH RISK HPV CERVICAL: Status: RESOLVED | Noted: 2018-03-07 | Resolved: 2019-07-16

## 2019-07-16 PROCEDURE — 87624 HPV HI-RISK TYP POOLED RSLT: CPT | Performed by: OBSTETRICS & GYNECOLOGY

## 2019-07-16 PROCEDURE — G0145 SCR C/V CYTO,THINLAYER,RESCR: HCPCS | Performed by: OBSTETRICS & GYNECOLOGY

## 2019-07-16 PROCEDURE — S0612 ANNUAL GYNECOLOGICAL EXAMINA: HCPCS | Performed by: OBSTETRICS & GYNECOLOGY

## 2019-07-16 RX ORDER — DROSPIRENONE AND ETHINYL ESTRADIOL 0.02-3(28)
1 KIT ORAL DAILY
Qty: 84 TABLET | Refills: 2 | Status: SHIPPED | OUTPATIENT
Start: 2019-07-16 | End: 2020-08-03

## 2019-07-16 NOTE — PROGRESS NOTES
Mirna L TucumcariBernhard  1970      CC:  Yearly exam    S:  52 y o  female here for yearly exam   She has been taking Gianvi for cycle control - has noticed some months she barely bleeds at all, and then other months are a little heavier  She is sexually active  She uses vasectomy for contraception  Had LEEP in 2018 - due for 12mos follow up now  No breast, bowel or bladder concerns  Last Pap 12/18 - 6 mos follow up per Dr Christiane More 10/18 - 3D, benign      Current Outpatient Medications:     cholecalciferol (VITAMIN D3) 1,000 units tablet, Take 1,000 Units by mouth daily, Disp: , Rfl:     drospirenone-ethinyl estradiol (GIANVI) 3-0 02 MG per tablet, Take 1 tablet by mouth daily, Disp: 84 tablet, Rfl: 2    fexofenadine (ALLEGRA) 180 MG tablet, Take 1 tablet by mouth daily, Disp: , Rfl:   Social History     Socioeconomic History    Marital status:      Spouse name: Not on file    Number of children: Not on file    Years of education: Not on file    Highest education level: Not on file   Occupational History    Occupation: Professor-Civil Engineering   Social Needs    Financial resource strain: Not on file    Food insecurity:     Worry: Not on file     Inability: Not on file   [employer] needs:     Medical: Not on file     Non-medical: Not on file   Tobacco Use    Smoking status: Never Smoker    Smokeless tobacco: Never Used   Substance and Sexual Activity    Alcohol use:  Yes     Alcohol/week: 2 0 standard drinks     Types: 1 Glasses of wine, 1 Standard drinks or equivalent per week     Frequency: 2-3 times a week     Drinks per session: 1 or 2     Binge frequency: Weekly     Comment: Socially    Drug use: No    Sexual activity: Yes     Partners: Male     Birth control/protection: Pill   Lifestyle    Physical activity:     Days per week: Not on file     Minutes per session: Not on file    Stress: Not on file   Relationships    Social connections:     Talks on phone: Not on file     Gets together: Not on file     Attends Nondenominational service: Not on file     Active member of club or organization: Not on file     Attends meetings of clubs or organizations: Not on file     Relationship status: Not on file    Intimate partner violence:     Fear of current or ex partner: Not on file     Emotionally abused: Not on file     Physically abused: Not on file     Forced sexual activity: Not on file   Other Topics Concern    Not on file   Social History Narrative    Daily Coffee Consumpton: 2 cups/day    Exercising regularly     Family History   Problem Relation Age of Onset    No Known Problems Mother     Scleroderma Father     Prostate cancer Maternal Grandfather     ALS Paternal Grandmother     Parkinsonism Paternal Grandfather     Arthritis Family     Cystic fibrosis Family         patient not a carrier    Other Family         Headache Syndromes    Down syndrome Family         nephew    Osteoporosis Family       Past Medical History:   Diagnosis Date    Abnormal Pap smear of cervix     Acute sinusitis     Allergic rhinitis     Onset: [id number]    Anemia     Dysplasia of cervix     MILD  LAST ASSESSED 8/18/2014    Head ache     HPV in female     LGSIL of cervix of undetermined significance     Low grade squamous intraepithelial lesion (LGSIL) on Papanicolaou smear of cervix     Resolved: 2014    Mild dysplasia of cervix (ELIANA I)     Last Assessed: [id number]    Mononucleosis     Resolved: 1992    Normal delivery     2003 son, 2005 daughter    Pyelonephritis     UTI (urinary tract infection)         O:  Blood pressure 100/74, height 5' 6" (1 676 m), weight 70 3 kg (155 lb), last menstrual period 06/30/2019  Patient appears well and is not in distress  Neck is supple without masses  Breasts are symmetrical without mass, tenderness, nipple discharge, skin changes or adenopathy  Abdomen is soft and nontender without masses     External genitals are normal without lesions or rashes  Vagina is normal without discharge or bleeding  Cervix is normal without discharge or lesion, + bleeding with pap  Uterus is normal, mobile, nontender without palpable mass  Adnexa are normal, nontender, without palpable mass  A:  Yearly exam      P:   Pap due - collected  Mammo slip provided    RTO one year for yearly exam or sooner as needed

## 2019-07-18 LAB
LAB AP GYN PRIMARY INTERPRETATION: NORMAL
LAB AP LMP: NORMAL
Lab: NORMAL

## 2020-02-25 DIAGNOSIS — Z30.9 ENCOUNTER FOR CONTRACEPTIVE MANAGEMENT, UNSPECIFIED TYPE: Primary | ICD-10-CM

## 2020-02-25 RX ORDER — DROSPIRENONE AND ETHINYL ESTRADIOL 0.02-3(28)
1 KIT ORAL DAILY
Qty: 90 TABLET | Refills: 1 | Status: SHIPPED | OUTPATIENT
Start: 2020-02-25 | End: 2020-07-06

## 2020-02-25 NOTE — TELEPHONE ENCOUNTER
Patient called she now uses   [address]  Patient needs refill sent to them

## 2020-05-11 ENCOUNTER — HOSPITAL ENCOUNTER (OUTPATIENT)
Dept: MAMMOGRAPHY | Facility: HOSPITAL | Age: 50
Discharge: HOME | End: 2020-05-11
Attending: OBSTETRICS & GYNECOLOGY
Payer: COMMERCIAL

## 2020-05-11 VITALS — HEIGHT: 66 IN | BODY MASS INDEX: 24.91 KG/M2 | WEIGHT: 155 LBS

## 2020-05-11 DIAGNOSIS — Z12.31 ENCOUNTER FOR SCREENING MAMMOGRAM FOR MALIGNANT NEOPLASM OF BREAST: ICD-10-CM

## 2020-05-11 PROCEDURE — 77067 SCR MAMMO BI INCL CAD: CPT

## 2020-05-11 PROCEDURE — 77063 BREAST TOMOSYNTHESIS BI: CPT

## 2020-07-06 ENCOUNTER — OFFICE VISIT (OUTPATIENT)
Dept: FAMILY MEDICINE CLINIC | Facility: CLINIC | Age: 50
End: 2020-07-06
Payer: COMMERCIAL

## 2020-07-06 VITALS
DIASTOLIC BLOOD PRESSURE: 68 MMHG | HEART RATE: 80 BPM | RESPIRATION RATE: 16 BRPM | BODY MASS INDEX: 24.43 KG/M2 | TEMPERATURE: 97.8 F | OXYGEN SATURATION: 99 % | WEIGHT: 152 LBS | SYSTOLIC BLOOD PRESSURE: 124 MMHG | HEIGHT: 66 IN

## 2020-07-06 DIAGNOSIS — L23.7 CONTACT DERMATITIS DUE TO POISON IVY: Primary | ICD-10-CM

## 2020-07-06 PROCEDURE — 99214 OFFICE O/P EST MOD 30 MIN: CPT | Performed by: NURSE PRACTITIONER

## 2020-07-06 PROCEDURE — 3008F BODY MASS INDEX DOCD: CPT | Performed by: NURSE PRACTITIONER

## 2020-07-06 PROCEDURE — 1036F TOBACCO NON-USER: CPT | Performed by: NURSE PRACTITIONER

## 2020-07-06 RX ORDER — LORATADINE 10 MG/1
10 TABLET ORAL DAILY
COMMUNITY

## 2020-07-06 RX ORDER — PREDNISONE 10 MG/1
TABLET ORAL
Qty: 20 TABLET | Refills: 0 | Status: SHIPPED | OUTPATIENT
Start: 2020-07-06 | End: 2020-09-14 | Stop reason: ALTCHOICE

## 2020-07-06 NOTE — PROGRESS NOTES
Assessment/Plan:      Diagnoses and all orders for this visit:    Contact dermatitis due to poison ivy  -     predniSONE 10 mg tablet; Take 4 tablets daily for 2 days, then 3 tablets daily for 2 days, then 2 tablets daily for 2 days, and then 1 tablet daily for 2 days  Discussion on contact dermatitis due to poison ivy and treatment  Prednisone taper prescribed  Medication information and side effects reviewed  Patient instructed to follow-up if symptoms get worse or do not get better  Subjective:     Patient ID: Jeaneth Esposito is a 48 y o  female  Patient reports a rash on her arms for the past week  Patient reports that she was working in the yard the day before the rash started  Patient reports that the rash may be from poison ivy  Patient reports that the rash looks like red bumps and some were oozing  Patient reports that the rash has spread up her arms and on her right side  Patient reports that the rash is itchy  Patient reports that she tried cortisone cream OTC  Denies any fever, pain, or sore throat  Denies any sob, cough, wheezing, difficulty breathing, or difficulty swallowing  Patient reports that the rash is not going away  Review of Systems   Constitutional: Negative for chills and fever  HENT: Negative for congestion, ear pain and sore throat  Respiratory: Negative for cough, chest tightness, shortness of breath and wheezing  Cardiovascular: Negative for chest pain  Gastrointestinal: Negative for abdominal pain, diarrhea, nausea and vomiting  Musculoskeletal: Negative for myalgias  Skin: Positive for rash  Neurological: Negative for dizziness, syncope, light-headedness and headaches  Objective:     Physical Exam   Constitutional: She is oriented to person, place, and time  She does not appear ill  No distress     HENT:   Right Ear: External ear normal    Left Ear: External ear normal    Mouth/Throat: Oropharynx is clear and moist    Eyes: Pupils are equal, round, and reactive to light  Conjunctivae are normal    Cardiovascular: Normal rate, regular rhythm and normal heart sounds  Pulmonary/Chest: Effort normal and breath sounds normal  No respiratory distress  She has no wheezes  Musculoskeletal:   Gait wnl  Neurological: She is alert and oriented to person, place, and time  Skin: She is not diaphoretic  Scattered pink papular rash noted on patient's arms  Psychiatric: She has a normal mood and affect  Vitals reviewed

## 2020-08-03 DIAGNOSIS — Z30.09 BIRTH CONTROL COUNSELING: ICD-10-CM

## 2020-08-03 RX ORDER — DROSPIRENONE AND ETHINYL ESTRADIOL 0.02-3(28)
KIT ORAL
Qty: 84 TABLET | Refills: 0 | Status: SHIPPED | OUTPATIENT
Start: 2020-08-03 | End: 2021-01-12

## 2020-09-14 ENCOUNTER — ANNUAL EXAM (OUTPATIENT)
Dept: OBGYN CLINIC | Facility: CLINIC | Age: 50
End: 2020-09-14
Payer: COMMERCIAL

## 2020-09-14 VITALS
HEIGHT: 66 IN | DIASTOLIC BLOOD PRESSURE: 70 MMHG | WEIGHT: 160.8 LBS | SYSTOLIC BLOOD PRESSURE: 112 MMHG | TEMPERATURE: 97.2 F | BODY MASS INDEX: 25.84 KG/M2

## 2020-09-14 DIAGNOSIS — Z12.11 SCREENING FOR COLON CANCER: ICD-10-CM

## 2020-09-14 DIAGNOSIS — Z12.4 CERVICAL CANCER SCREENING: ICD-10-CM

## 2020-09-14 DIAGNOSIS — Z12.31 ENCOUNTER FOR SCREENING MAMMOGRAM FOR MALIGNANT NEOPLASM OF BREAST: ICD-10-CM

## 2020-09-14 DIAGNOSIS — Z01.419 ENCOUNTER FOR GYNECOLOGICAL EXAMINATION WITHOUT ABNORMAL FINDING: Primary | ICD-10-CM

## 2020-09-14 DIAGNOSIS — Z11.51 SCREENING FOR HUMAN PAPILLOMAVIRUS (HPV): ICD-10-CM

## 2020-09-14 PROCEDURE — G0145 SCR C/V CYTO,THINLAYER,RESCR: HCPCS | Performed by: OBSTETRICS & GYNECOLOGY

## 2020-09-14 PROCEDURE — S0612 ANNUAL GYNECOLOGICAL EXAMINA: HCPCS | Performed by: OBSTETRICS & GYNECOLOGY

## 2020-09-14 PROCEDURE — 87624 HPV HI-RISK TYP POOLED RSLT: CPT | Performed by: OBSTETRICS & GYNECOLOGY

## 2020-09-14 NOTE — PROGRESS NOTES
Mariann Shantal  1970      CC:  Yearly exam    S:  48 y o  female here for yearly exam  Her cycles are regular, not heavy or crampy  Still does get a cycle most months with her OCP  Sexual activity: She is sexually active without pain, bleeding or dryness  Contraception: She uses vasectomy for contraception  , but has a partner  Daughter is 13 and Son is 16  She has been working from home  Pap History:  2/2018 - ASCUS, HPV positive,   Colpo with ELIANA II, LEEP benign/LSIL  12/2018 - Normal Cytology, Negative HPV  7/2019 - Normal Cytology, Negative HPV    Last Mammo  5/11/20 - Negative, [facility] 1  Last Colonoscopy - open to referral, provided today    We reviewed ASCCP guidelines for Pap testing today  Family hx of breast cancer: no  Family hx of ovarian cancer: no  Family hx of colon cancer: no      Current Outpatient Medications:     cholecalciferol (VITAMIN D3) 1,000 units tablet, Take 1,000 Units by mouth daily, Disp: , Rfl:     drospirenone-ethinyl estradiol (WILFREDO) 3-0 02 MG per tablet, TAKE 1 TABLET BY MOUTH EVERY DAY, Disp: 84 tablet, Rfl: 0    loratadine (CLARITIN) 10 mg tablet, Take 10 mg by mouth daily, Disp: , Rfl:   Social History     Socioeconomic History    Marital status:      Spouse name: Not on file    Number of children: Not on file    Years of education: Not on file    Highest education level: Not on file   Occupational History    Occupation: Professor-Civil Engineering   Social Needs    Financial resource strain: Not on file    Food insecurity     Worry: Not on file     Inability: Not on file   [employer] needs     Medical: Not on file     Non-medical: Not on file   Tobacco Use    Smoking status: Never Smoker    Smokeless tobacco: Never Used   Substance and Sexual Activity    Alcohol use:  Yes     Alcohol/week: 2 0 standard drinks     Types: 1 Glasses of wine, 1 Standard drinks or equivalent per week     Frequency: 2-3 times a week Drinks per session: 1 or 2     Binge frequency: Weekly     Comment: Socially    Drug use: No    Sexual activity: Yes     Partners: Male     Birth control/protection: Pill   Lifestyle    Physical activity     Days per week: Not on file     Minutes per session: Not on file    Stress: Not on file   Relationships    Social connections     Talks on phone: Not on file     Gets together: Not on file     Attends Yarsani service: Not on file     Active member of club or organization: Not on file     Attends meetings of clubs or organizations: Not on file     Relationship status: Not on file    Intimate partner violence     Fear of current or ex partner: Not on file     Emotionally abused: Not on file     Physically abused: Not on file     Forced sexual activity: Not on file   Other Topics Concern    Not on file   Social History Narrative    Daily Coffee [employer]: 2 cups/day    Exercising regularly     Family History   Problem Relation Age of Onset    No Known Problems Mother     Scleroderma Father     Prostate cancer Maternal Grandfather     ALS Paternal Grandmother     Parkinsonism Paternal Grandfather     Arthritis Family     Cystic fibrosis Family         patient not a carrier    Other Family         Headache Syndromes    Down syndrome Family         nephew    Osteoporosis Family       Past Medical History:   Diagnosis Date    Abnormal Pap smear of cervix     Acute sinusitis     Allergic rhinitis     Onset: [id number]    Anemia     Dysplasia of cervix     MILD  LAST ASSESSED 8/18/2014    Head ache     HPV in female     LGSIL of cervix of undetermined significance     Low grade squamous intraepithelial lesion (LGSIL) on Papanicolaou smear of cervix     Resolved: 2014    Mild dysplasia of cervix (ELIANA I)     Last Assessed: [id number]    Mononucleosis     Resolved: 1992    Normal delivery     2003 son, 2005 daughter    Pyelonephritis     UTI (urinary tract infection)         Review of Systems Respiratory: Negative  Cardiovascular: Negative  Gastrointestinal: Negative for constipation and diarrhea  Genitourinary: Negative for difficulty urinating, pelvic pain, vaginal bleeding, vaginal discharge, itching or odor  O:  Blood pressure 112/70, temperature (!) 97 2 °F (36 2 °C), temperature source Tympanic, height 5' 6" (1 676 m), weight 72 9 kg (160 lb 12 8 oz), not currently breastfeeding  Patient appears well and is not in distress  Breasts are symmetrical without mass, tenderness, nipple discharge, skin changes or adenopathy  Abdomen is soft and nontender without masses  External genitals are normal without lesions or rashes  Urethral meatus and urethra are normal  Bladder is normal to palpation  Vagina is normal without discharge or bleeding  Cervix is normal without discharge or lesion  Uterus is normal, mobile, nontender without palpable mass  Adnexa are normal, nontender, without palpable mass  A:  Yearly exam, Perimenopause     P:   Pap  & HPV today, if normal then space to 3 years  Mammo slip provided    Referral for colonoscopy provided  Will trial stopping her OCPs as she does not need them for contraception and see how her cycles do - if become heavy or bothersome will consider restarting them just for cycle control until next year  She will call if she needs refills  RTO one year for yearly exam or sooner as needed

## 2020-09-17 LAB
HPV HR 12 DNA CVX QL NAA+PROBE: NEGATIVE
HPV16 DNA CVX QL NAA+PROBE: NEGATIVE
HPV18 DNA CVX QL NAA+PROBE: NEGATIVE
LAB AP GYN PRIMARY INTERPRETATION: NORMAL
Lab: NORMAL

## 2021-01-13 ENCOUNTER — OFFICE VISIT (OUTPATIENT)
Dept: FAMILY MEDICINE CLINIC | Facility: CLINIC | Age: 51
End: 2021-01-13
Payer: COMMERCIAL

## 2021-01-13 VITALS
HEIGHT: 66 IN | OXYGEN SATURATION: 97 % | BODY MASS INDEX: 26.52 KG/M2 | RESPIRATION RATE: 18 BRPM | DIASTOLIC BLOOD PRESSURE: 70 MMHG | SYSTOLIC BLOOD PRESSURE: 106 MMHG | HEART RATE: 75 BPM | WEIGHT: 165 LBS

## 2021-01-13 DIAGNOSIS — D50.9 IRON DEFICIENCY ANEMIA, UNSPECIFIED IRON DEFICIENCY ANEMIA TYPE: ICD-10-CM

## 2021-01-13 DIAGNOSIS — Z00.00 PREVENTATIVE HEALTH CARE: Primary | ICD-10-CM

## 2021-01-13 DIAGNOSIS — N92.6 MENSTRUAL IRREGULARITY: ICD-10-CM

## 2021-01-13 DIAGNOSIS — Z00.00 ANNUAL PHYSICAL EXAM: ICD-10-CM

## 2021-01-13 DIAGNOSIS — M72.2 PLANTAR FASCIITIS, BILATERAL: ICD-10-CM

## 2021-01-13 DIAGNOSIS — Z12.11 SCREEN FOR COLON CANCER: ICD-10-CM

## 2021-01-13 DIAGNOSIS — Z13.220 SCREENING, LIPID: ICD-10-CM

## 2021-01-13 PROCEDURE — 3008F BODY MASS INDEX DOCD: CPT | Performed by: FAMILY MEDICINE

## 2021-01-13 PROCEDURE — 99396 PREV VISIT EST AGE 40-64: CPT | Performed by: FAMILY MEDICINE

## 2021-01-13 PROCEDURE — 3725F SCREEN DEPRESSION PERFORMED: CPT | Performed by: FAMILY MEDICINE

## 2021-01-13 PROCEDURE — 1036F TOBACCO NON-USER: CPT | Performed by: FAMILY MEDICINE

## 2021-01-13 NOTE — PROGRESS NOTES
ADULT ANNUAL [address] [place]    NAME: Franca Angel  AGE: 48 y o  SEX: female  : 1970     DATE: 2021     Assessment and Plan:     Problem List Items Addressed This Visit        Musculoskeletal and Integument    Plantar fasciitis, bilateral       Other    Screening, lipid - Primary    Relevant Orders    Lipid panel    Iron deficiency anemia    Relevant Orders    CBC and differential    Ferritin    TSH, 3rd generation with Free T4 reflex    Menstrual irregularity    Relevant Orders    TSH, 3rd generation with Free T4 reflex          Immunizations and preventive care screenings were discussed with patient today  Appropriate education was printed on patient's after visit summary  Counseling:  Dental Health: discussed importance of regular tooth brushing, flossing, and dental visits  · Exercise: the importance of regular exercise/physical activity was discussed  Recommend exercise 3-5 times per week for at least 30 minutes  BMI Counseling: Body mass index is 26 63 kg/m²  The BMI is above normal  Nutrition recommendations include decreasing portion sizes, encouraging healthy choices of fruits and vegetables and decreasing fast food intake  Exercise recommendations include moderate physical activity 150 minutes/week  No pharmacotherapy was ordered  No follow-ups on file  Chief Complaint:     Chief Complaint   Patient presents with    Annual Exam      History of Present Illness:     Adult Annual Physical   Patient here for a comprehensive physical exam  The patient reports problems - B/l foot pain  Symptoms worse in morning, resolve spontaneously  Diet and Physical Activity  · Diet/Nutrition: well balanced diet  · Exercise: moderate cardiovascular exercise        Depression Screening  PHQ-9 Depression Screening    PHQ-9:   Frequency of the following problems over the past two weeks:      Little interest or pleasure in doing things: 0 - not at all  Feeling down, depressed, or hopeless: 0 - not at all  PHQ-2 Score: 0       General Health  · Sleep: sleeps well  · Hearing: normal - bilateral   · Vision: no vision problems  · Dental: regular dental visits  /GYN Health  · Patient is: perimenopausal     Review of Systems:     Review of Systems   Constitutional: Negative  HENT: Negative  Eyes: Negative  Respiratory: Negative  Cardiovascular: Negative  Gastrointestinal: Negative  Endocrine: Negative  Genitourinary: Positive for menstrual problem  Musculoskeletal: Negative  Morning foot pain  Skin: Negative  Neurological: Negative  Psychiatric/Behavioral: Negative         Past Medical History:     Past Medical History:   Diagnosis Date    Abnormal Pap smear of cervix     Acute sinusitis     Allergic rhinitis     Onset: [id number]    Anemia     Dysplasia of cervix     MILD  LAST ASSESSED 8/18/2014    Head ache     HPV in female     LGSIL of cervix of undetermined significance     Low grade squamous intraepithelial lesion (LGSIL) on Papanicolaou smear of cervix     Resolved: 2014    Mild dysplasia of cervix (ELIANA I)     Last Assessed: [id number]    Mononucleosis     Resolved: 1992    Normal delivery     2003 son, 2005 daughter    Pyelonephritis     UTI (urinary tract infection)       Past Surgical History:     Past Surgical History:   Procedure Laterality Date    BREAST BIOPSY Left 2017    COLPOSCOPY W/ BIOPSY / CURETTAGE  2014, 2018    LSIL    MAMMO STEREOTACTIC BREAST BIOPSY LEFT (ALL INC) Left 7/10/2017    OK EXC SKIN BENIG 1 1-2 CM REMAINDR BODY Right 12/6/2017    Procedure: EXCISION NODULE CERVICAL REGION;  Surgeon: Olga Connor MD;  [facility];  Service: General      Social History:        Social History     Socioeconomic History    Marital status:      Spouse name: None    Number of children: None    Years of education: None    Highest education level: None   Occupational History    Occupation: Professor-Civil Engineering   Social Needs    Financial resource strain: None    Food insecurity     Worry: None     Inability: None    Transportation needs     Medical: None     Non-medical: None   Tobacco Use    Smoking status: Never Smoker    Smokeless tobacco: Never Used   Substance and Sexual Activity    Alcohol use:  Yes     Alcohol/week: 2 0 standard drinks     Types: 1 Glasses of wine, 1 Standard drinks or equivalent per week     Frequency: 2-3 times a week     Drinks per session: 1 or 2     Binge frequency: Weekly     Comment: Socially    Drug use: No    Sexual activity: Yes     Partners: Male     Comment: partner vasectomy   Lifestyle    Physical activity     Days per week: None     Minutes per session: None    Stress: None   Relationships    Social connections     Talks on phone: None     Gets together: None     Attends [employer] service: None     Active member of club or organization: None     Attends meetings of clubs or organizations: None     Relationship status: None    Intimate partner violence     Fear of current or ex partner: None     Emotionally abused: None     Physically abused: None     Forced sexual activity: None   Other Topics Concern    None   Social History Narrative    Daily Coffee [place]: 2 cups/day    Exercising regularly      Family History:     Family History   Problem Relation Age of Onset    No Known Problems Mother     Scleroderma Father     Prostate cancer Maternal Grandfather     ALS Paternal Grandmother     Parkinsonism Paternal Grandfather     Arthritis Family     Cystic fibrosis Family         patient not a carrier    Other Family         Headache Syndromes    Down syndrome Family         nephew    Osteoporosis Family       Current Medications:     Current Outpatient Medications   Medication Sig Dispense Refill    Cholecalciferol (VITAMIN D3 PO) Take 2,000 Units by mouth daily       loratadine (CLARITIN) 10 mg tablet Take 10 mg by mouth daily       No current facility-administered medications for this visit  Allergies: Allergies   Allergen Reactions    Codeine GI Intolerance     Reaction Date: [id number];   Reaction Date: 11Feb2008;     Acetaminophen-Codeine      Reaction Date: 17Nov2011;       Physical Exam:     /70   Pulse 75   Resp 18   Ht 5' 6" (1 676 m)   Wt 74 8 kg (165 lb)   SpO2 97%   BMI 26 63 kg/m²     Physical Exam  Vitals signs and nursing note reviewed  HENT:      Right Ear: External ear normal       Left Ear: External ear normal    Eyes:      Conjunctiva/sclera: Conjunctivae normal    Neck:      Musculoskeletal: Normal range of motion and neck supple  Cardiovascular:      Rate and Rhythm: Normal rate and regular rhythm  Heart sounds: Normal heart sounds  Pulmonary:      Effort: Pulmonary effort is normal       Breath sounds: Normal breath sounds  Abdominal:      General: Bowel sounds are normal       Palpations: Abdomen is soft  Musculoskeletal: Normal range of motion  General: No tenderness  Skin:     General: Skin is warm  Neurological:      Mental Status: She is alert and oriented to person, place, and time  Psychiatric:         Behavior: Behavior normal          Thought Content:  Thought content normal          Judgment: Judgment normal           Javan Campuzano MD  [place] [address]

## 2021-01-13 NOTE — PATIENT INSTRUCTIONS

## 2021-03-08 ENCOUNTER — TELEPHONE (OUTPATIENT)
Dept: FAMILY MEDICINE CLINIC | Facility: CLINIC | Age: 51
End: 2021-03-08

## 2021-03-08 NOTE — TELEPHONE ENCOUNTER
----- Message from [facility] sent at 3/8/2021  9:25 AM EST -----  Please let patient know Cologuard is negative  Routine screening in 3 years

## 2021-03-28 ENCOUNTER — IMMUNIZATIONS (OUTPATIENT)
Dept: FAMILY MEDICINE CLINIC | Facility: HOSPITAL | Age: 51
End: 2021-03-28

## 2021-03-28 DIAGNOSIS — Z23 ENCOUNTER FOR IMMUNIZATION: Primary | ICD-10-CM

## 2021-03-28 PROCEDURE — 91300 SARS-COV-2 / COVID-19 MRNA VACCINE (PFIZER-BIONTECH) 30 MCG: CPT

## 2021-03-28 PROCEDURE — 0001A SARS-COV-2 / COVID-19 MRNA VACCINE (PFIZER-BIONTECH) 30 MCG: CPT

## 2021-04-21 ENCOUNTER — IMMUNIZATIONS (OUTPATIENT)
Dept: FAMILY MEDICINE CLINIC | Facility: HOSPITAL | Age: 51
End: 2021-04-21

## 2021-04-21 DIAGNOSIS — Z23 ENCOUNTER FOR IMMUNIZATION: Primary | ICD-10-CM

## 2021-04-21 PROCEDURE — 0002A SARS-COV-2 / COVID-19 MRNA VACCINE (PFIZER-BIONTECH) 30 MCG: CPT

## 2021-04-21 PROCEDURE — 91300 SARS-COV-2 / COVID-19 MRNA VACCINE (PFIZER-BIONTECH) 30 MCG: CPT

## 2021-05-16 ENCOUNTER — HOSPITAL ENCOUNTER (EMERGENCY)
Facility: HOSPITAL | Age: 51
Discharge: HOME | End: 2021-05-16
Attending: EMERGENCY MEDICINE
Payer: COMMERCIAL

## 2021-05-16 ENCOUNTER — APPOINTMENT (EMERGENCY)
Dept: CT IMAGING | Facility: HOSPITAL | Age: 51
End: 2021-05-16
Payer: COMMERCIAL

## 2021-05-16 VITALS
RESPIRATION RATE: 16 BRPM | HEART RATE: 82 BPM | WEIGHT: 162.26 LBS | OXYGEN SATURATION: 99 % | BODY MASS INDEX: 26.19 KG/M2 | TEMPERATURE: 97.9 F | SYSTOLIC BLOOD PRESSURE: 119 MMHG | DIASTOLIC BLOOD PRESSURE: 80 MMHG

## 2021-05-16 DIAGNOSIS — D49.89 PELVIC NEOPLASM: Primary | ICD-10-CM

## 2021-05-16 LAB
ALBUMIN SERPL BCP-MCNC: 3.6 G/DL (ref 3.5–5)
ALP SERPL-CCNC: 66 U/L (ref 46–116)
ALT SERPL W P-5'-P-CCNC: 17 U/L (ref 12–78)
ANION GAP SERPL CALCULATED.3IONS-SCNC: 10 MMOL/L (ref 4–13)
AST SERPL W P-5'-P-CCNC: 16 U/L (ref 5–45)
BASOPHILS # BLD AUTO: 0.03 THOUSANDS/ΜL (ref 0–0.1)
BASOPHILS NFR BLD AUTO: 0 % (ref 0–1)
BILIRUB SERPL-MCNC: 0.59 MG/DL (ref 0.2–1)
BILIRUB UR QL STRIP: NEGATIVE
BUN SERPL-MCNC: 8 MG/DL (ref 5–25)
CALCIUM SERPL-MCNC: 8.7 MG/DL (ref 8.3–10.1)
CHLORIDE SERPL-SCNC: 107 MMOL/L (ref 100–108)
CLARITY UR: CLEAR
CO2 SERPL-SCNC: 25 MMOL/L (ref 21–32)
COLOR UR: YELLOW
CREAT SERPL-MCNC: 0.83 MG/DL (ref 0.6–1.3)
EOSINOPHIL # BLD AUTO: 0.09 THOUSAND/ΜL (ref 0–0.61)
EOSINOPHIL NFR BLD AUTO: 1 % (ref 0–6)
ERYTHROCYTE [DISTWIDTH] IN BLOOD BY AUTOMATED COUNT: 16.2 % (ref 11.6–15.1)
GFR SERPL CREATININE-BSD FRML MDRD: 82 ML/MIN/1.73SQ M
GLUCOSE SERPL-MCNC: 94 MG/DL (ref 65–140)
GLUCOSE UR STRIP-MCNC: NEGATIVE MG/DL
HCT VFR BLD AUTO: 36.8 % (ref 34.8–46.1)
HGB BLD-MCNC: 11.4 G/DL (ref 11.5–15.4)
HGB UR QL STRIP.AUTO: NEGATIVE
IMM GRANULOCYTES # BLD AUTO: 0.03 THOUSAND/UL (ref 0–0.2)
IMM GRANULOCYTES NFR BLD AUTO: 0 % (ref 0–2)
KETONES UR STRIP-MCNC: ABNORMAL MG/DL
LEUKOCYTE ESTERASE UR QL STRIP: NEGATIVE
LIPASE SERPL-CCNC: 51 U/L (ref 73–393)
LYMPHOCYTES # BLD AUTO: 2.16 THOUSANDS/ΜL (ref 0.6–4.47)
LYMPHOCYTES NFR BLD AUTO: 28 % (ref 14–44)
MCH RBC QN AUTO: 27.1 PG (ref 26.8–34.3)
MCHC RBC AUTO-ENTMCNC: 31 G/DL (ref 31.4–37.4)
MCV RBC AUTO: 87 FL (ref 82–98)
MONOCYTES # BLD AUTO: 0.73 THOUSAND/ΜL (ref 0.17–1.22)
MONOCYTES NFR BLD AUTO: 10 % (ref 4–12)
NEUTROPHILS # BLD AUTO: 4.67 THOUSANDS/ΜL (ref 1.85–7.62)
NEUTS SEG NFR BLD AUTO: 61 % (ref 43–75)
NITRITE UR QL STRIP: NEGATIVE
NRBC BLD AUTO-RTO: 0 /100 WBCS
PH UR STRIP.AUTO: 6.5 [PH]
PLATELET # BLD AUTO: 251 THOUSANDS/UL (ref 149–390)
PMV BLD AUTO: 10.9 FL (ref 8.9–12.7)
POTASSIUM SERPL-SCNC: 3.9 MMOL/L (ref 3.5–5.3)
PROT SERPL-MCNC: 6.7 G/DL (ref 6.4–8.2)
PROT UR STRIP-MCNC: NEGATIVE MG/DL
RBC # BLD AUTO: 4.21 MILLION/UL (ref 3.81–5.12)
SODIUM SERPL-SCNC: 142 MMOL/L (ref 136–145)
SP GR UR STRIP.AUTO: 1.01 (ref 1–1.03)
UROBILINOGEN UR QL STRIP.AUTO: 0.2 E.U./DL
WBC # BLD AUTO: 7.71 THOUSAND/UL (ref 4.31–10.16)

## 2021-05-16 PROCEDURE — 96360 HYDRATION IV INFUSION INIT: CPT

## 2021-05-16 PROCEDURE — 85025 COMPLETE CBC W/AUTO DIFF WBC: CPT | Performed by: EMERGENCY MEDICINE

## 2021-05-16 PROCEDURE — G1004 CDSM NDSC: HCPCS

## 2021-05-16 PROCEDURE — 80053 COMPREHEN METABOLIC PANEL: CPT | Performed by: EMERGENCY MEDICINE

## 2021-05-16 PROCEDURE — 81003 URINALYSIS AUTO W/O SCOPE: CPT | Performed by: EMERGENCY MEDICINE

## 2021-05-16 PROCEDURE — 83690 ASSAY OF LIPASE: CPT | Performed by: EMERGENCY MEDICINE

## 2021-05-16 PROCEDURE — 74176 CT ABD & PELVIS W/O CONTRAST: CPT

## 2021-05-16 PROCEDURE — 86304 IMMUNOASSAY TUMOR CA 125: CPT | Performed by: OBSTETRICS & GYNECOLOGY

## 2021-05-16 PROCEDURE — 36415 COLL VENOUS BLD VENIPUNCTURE: CPT | Performed by: EMERGENCY MEDICINE

## 2021-05-16 PROCEDURE — 99284 EMERGENCY DEPT VISIT MOD MDM: CPT

## 2021-05-16 PROCEDURE — 99284 EMERGENCY DEPT VISIT MOD MDM: CPT | Performed by: EMERGENCY MEDICINE

## 2021-05-16 RX ADMIN — SODIUM CHLORIDE 1000 ML: 0.9 INJECTION, SOLUTION INTRAVENOUS at 16:32

## 2021-05-16 NOTE — ED PROVIDER NOTES
History  Chief Complaint   Patient presents with    Abdominal Pain     Intermitent LLQ pain shooting up side x 2 days  Denies nausea and diarrhea, vomited today  51-year-old female presents the emergency department for evaluation 2 day history of left-sided lower abdominal pain with radiation into the left flank  Patient reports intermittent sharp pains along this location that started yesterday  Patient recalls having 1 episode of left-sided flank pain approximately 2 weeks that woke her from sleep  It was transient and did not recur until yesterday  Patient states that the pain was severe prior to arrival today and it did cause her to have an episode of vomiting  She denies fevers or chills  No dysuria or hematuria  She has been having irregular menstrual cycles and did start spotting 2 days ago  Her last menstrual period was very heavy  Patient denies history of kidney stone  She has been having normal bowel movements  She reports normal appetite but she was afraid to eat today as she was concerned that eating might cause her pain to intensify  History provided by:  Patient, significant other and medical records   used: No    Flank Pain  Pain location:  LLQ  Pain quality: sharp and shooting    Pain radiates to:  L flank  Pain severity:  Moderate  Onset quality:  Gradual  Duration:  2 days  Timing:  Intermittent  Progression:  Waxing and waning  Chronicity:  New  Context: not diet changes, not eating, not previous surgeries and not suspicious food intake    Relieved by:  Position changes  Worsened by:  Nothing  Ineffective treatments:  Acetaminophen  Associated symptoms: vaginal bleeding and vomiting    Associated symptoms: no anorexia, no constipation, no diarrhea, no dysuria, no fever and no nausea    Risk factors: not pregnant and no recent hospitalization        Prior to Admission Medications   Prescriptions Last Dose Informant Patient Reported? Taking? Cholecalciferol (VITAMIN D3 PO)  Self Yes No   Sig: Take 2,000 Units by mouth daily    loratadine (CLARITIN) 10 mg tablet  Self Yes No   Sig: Take 10 mg by mouth daily      Facility-Administered Medications: None       Past Medical History:   Diagnosis Date    Abnormal Pap smear of cervix     Acute sinusitis     Allergic rhinitis     Onset: [id number]    Anemia     Dysplasia of cervix     MILD  LAST ASSESSED 8/18/2014    Head ache     HPV in female     LGSIL of cervix of undetermined significance     Low grade squamous intraepithelial lesion (LGSIL) on Papanicolaou smear of cervix     Resolved: 2014    Mild dysplasia of cervix (ELIANA I)     Last Assessed: [id number]    Mononucleosis     Resolved: 1992    Normal delivery     2003 son, 2005 daughter    Pyelonephritis     UTI (urinary tract infection)        Past Surgical History:   Procedure Laterality Date    BREAST BIOPSY Left 2017    COLPOSCOPY W/ BIOPSY / CURETTAGE  2014, 2018    LSIL    MAMMO STEREOTACTIC BREAST BIOPSY LEFT (ALL INC) Left 7/10/2017    ME EXC SKIN BENIG 1 1-2 CM REMAINDR BODY Right 12/6/2017    Procedure: EXCISION NODULE CERVICAL REGION;  Surgeon: Zaira Bishop MD;  [facility];  Service: General       Family History   Problem Relation Age of Onset    No Known Problems Mother     Scleroderma Father     Prostate cancer Maternal Grandfather     ALS Paternal Grandmother     Parkinsonism Paternal Grandfather     Arthritis Family     Cystic fibrosis Family         patient not a carrier    Other Family         Headache Syndromes    Down syndrome Family         nephew    Osteoporosis Family     No Known Problems Daughter     Cancer Paternal Aunt      I have reviewed and agree with the history as documented  E-Cigarette/Vaping     E-Cigarette/Vaping Substances     Social History     Tobacco Use    Smoking status: Never Smoker    Smokeless tobacco: Never Used   Substance Use Topics    Alcohol use:  Yes Alcohol/week: 2 0 standard drinks     Types: 1 Glasses of wine, 1 Standard drinks or equivalent per week     Frequency: 2-3 times a week     Drinks per session: 1 or 2     Binge frequency: Weekly     Comment: Socially    Drug use: No       Review of Systems   Constitutional: Negative for fever  Gastrointestinal: Positive for vomiting  Negative for anorexia, constipation, diarrhea and nausea  Genitourinary: Positive for flank pain and vaginal bleeding  Negative for dysuria  Vaginal spotting   Musculoskeletal: Negative for back pain  All other systems reviewed and are negative  Physical Exam  Physical Exam  Vitals signs and nursing note reviewed  Constitutional:       General: She is not in acute distress  Appearance: She is well-developed  She is not ill-appearing  HENT:      Head: Normocephalic  Nose: Nose normal       Mouth/Throat:      Pharynx: No oropharyngeal exudate  Eyes:      Conjunctiva/sclera: Conjunctivae normal       Pupils: Pupils are equal, round, and reactive to light  Neck:      Musculoskeletal: Normal range of motion and neck supple  Cardiovascular:      Rate and Rhythm: Normal rate and regular rhythm  Heart sounds: Normal heart sounds  Pulmonary:      Effort: Pulmonary effort is normal       Breath sounds: Normal breath sounds  Abdominal:      General: Bowel sounds are normal  There is no distension  Palpations: Abdomen is soft  Tenderness: There is abdominal tenderness in the left upper quadrant and left lower quadrant  There is no left CVA tenderness, guarding or rebound  Musculoskeletal: Normal range of motion  General: No tenderness or deformity  Lymphadenopathy:      Cervical: No cervical adenopathy  Skin:     General: Skin is warm and dry  Findings: No rash  Neurological:      Mental Status: She is alert and oriented to person, place, and time  Cranial Nerves: No cranial nerve deficit        Sensory: No sensory deficit  Motor: No abnormal muscle tone  Coordination: Coordination normal       Gait: Gait normal       Deep Tendon Reflexes: Reflexes are normal and symmetric  Psychiatric:         Behavior: Behavior normal          Thought Content:  Thought content normal          Judgment: Judgment normal          Vital Signs  ED Triage Vitals   Temperature Pulse Respirations Blood Pressure SpO2   05/16/21 1443 05/16/21 1443 05/16/21 1443 05/16/21 1443 05/16/21 1443   97 9 °F (36 6 °C) 90 18 138/80 100 %      Temp Source Heart Rate Source Patient Position - Orthostatic VS BP Location FiO2 (%)   05/16/21 1443 05/16/21 1443 05/16/21 1750 05/16/21 1443 --   Oral Monitor Lying Left arm       Pain Score       05/16/21 1443       4           Vitals:    05/16/21 1443 05/16/21 1750 05/16/21 2007   BP: 138/80 119/79 119/80   Pulse: 90 69 82   Patient Position - Orthostatic VS:  Lying Lying         Visual Acuity      ED Medications  Medications   sodium chloride 0 9 % bolus 1,000 mL (0 mL Intravenous Stopped 5/16/21 1749)       Diagnostic Studies  Results Reviewed     Procedure Component Value Units Date/Time     [[id number]]  (Normal) Collected: 05/16/21 2031    Lab Status: Final result Specimen: Blood from Arm, Left Updated: 05/17/21 1006      17 9 U/mL     UA w Reflex to Microscopic w Reflex to Culture [[id number]]  (Abnormal) Collected: 05/16/21 1749    Lab Status: Final result Specimen: Urine, Clean Catch Updated: 05/16/21 1810     Color, UA Yellow     Clarity, UA Clear     Specific Gravity, UA 1 010     pH, UA 6 5     Leukocytes, UA Negative     Nitrite, UA Negative     Protein, UA Negative mg/dl      Glucose, UA Negative mg/dl      Ketones, UA 15 (1+) mg/dl      Urobilinogen, UA 0 2 E U /dl      Bilirubin, UA Negative     Blood, UA Negative    Comprehensive metabolic panel [[id number]] Collected: 05/16/21 1631    Lab Status: Final result Specimen: Blood from Arm, Left Updated: 05/16/21 1703     Sodium 142 mmol/L Potassium 3 9 mmol/L      Chloride 107 mmol/L      CO2 25 mmol/L      ANION GAP 10 mmol/L      BUN 8 mg/dL      Creatinine 0 83 mg/dL      Glucose 94 mg/dL      Calcium 8 7 mg/dL      AST 16 U/L      ALT 17 U/L      Alkaline Phosphatase 66 U/L      Total Protein 6 7 g/dL      Albumin 3 6 g/dL      Total Bilirubin 0 59 mg/dL      eGFR 82 ml/min/1 73sq m     Narrative:      National Kidney Disease Foundation guidelines for Chronic Kidney Disease (CKD):     Stage 1 with normal or high GFR (GFR > 90 mL/min/1 73 square meters)    Stage 2 Mild CKD (GFR = 60-89 mL/min/1 73 square meters)    Stage 3A Moderate CKD (GFR = 45-59 mL/min/1 73 square meters)    Stage 3B Moderate CKD (GFR = 30-44 mL/min/1 73 square meters)    Stage 4 Severe CKD (GFR = 15-29 mL/min/1 73 square meters)    Stage 5 End Stage CKD (GFR <15 mL/min/1 73 square meters)  Note: GFR calculation is accurate only with a steady state creatinine    Lipase [[id number]]  (Abnormal) Collected: 05/16/21 1631    Lab Status: Final result Specimen: Blood from Arm, Left Updated: 05/16/21 1703     Lipase 51 u/L     CBC and differential [[id number]]  (Abnormal) Collected: 05/16/21 1631    Lab Status: Final result Specimen: Blood from Arm, Left Updated: 05/16/21 1640     WBC 7 71 Thousand/uL      RBC 4 21 Million/uL      Hemoglobin 11 4 g/dL      Hematocrit 36 8 %      MCV 87 fL      MCH 27 1 pg      MCHC 31 0 g/dL      RDW 16 2 %      MPV 10 9 fL      Platelets 101 Thousands/uL      nRBC 0 /100 WBCs      Neutrophils Relative 61 %      Immat GRANS % 0 %      Lymphocytes Relative 28 %      Monocytes Relative 10 %      Eosinophils Relative 1 %      Basophils Relative 0 %      Neutrophils Absolute 4 67 Thousands/µL      Immature Grans Absolute 0 03 Thousand/uL      Lymphocytes Absolute 2 16 Thousands/µL      Monocytes Absolute 0 73 Thousand/µL      Eosinophils Absolute 0 09 Thousand/µL      Basophils Absolute 0 03 Thousands/µL                  CT renal stone study abdomen pelvis without contrast   Final Result by James Terry MD (05/16 1912)      Large pelvic predominantly cystic structure partially evaluated due to lack of IV contrast measuring 9 3 x 8 7 x 8 9 cm  This is most likely of adnexal etiology suggesting serous cystadenoma, cystadenocarcinoma  Further imaging evaluation with ultrasound/ MRI recommended on a nonemergent basis along with GYN consult  Workstation performed: [id number]                    Procedures  Procedures         ED Course                                           MDM  Number of Diagnoses or Management Options  Pelvic neoplasm: new and requires workup     Amount and/or Complexity of Data Reviewed  Clinical lab tests: ordered and reviewed  Tests in the radiology section of CPT®: ordered and reviewed  Decide to obtain previous medical records or to obtain history from someone other than the patient: yes  Obtain history from someone other than the patient: yes  Discuss the patient with other providers: yes  Independent visualization of images, tracings, or specimens: yes    Risk of Complications, Morbidity, and/or Mortality  General comments: Patient signed out to Dr Tiera Cruz pending ultrasound results    Patient Progress  Patient progress: stable      Disposition  Final diagnoses:   Pelvic neoplasm     Time reflects when diagnosis was documented in both MDM as applicable and the Disposition within this note     Time User Action Codes Description Comment    5/16/2021  8:16 PM Deepti Isidro Add [D49 89] Pelvic neoplasm       ED Disposition     ED Disposition Condition Date/Time Comment    Discharge Stable Sun May 16, 2021  8:16 PM Garnette Dose discharge to home/self care              Follow-up Information     Follow up With Specialties Details Why Contact Info Additional Femi Gu MD Obstetrics and Gynecology, Obstetrics, Gynecology   [facility] [address] [place] [address] Emergency Department Emergency Medicine  If symptoms worsen [address] Emergency Department, [address], [facility], [place], [address]          Discharge Medication List as of 5/16/2021  8:23 PM      CONTINUE these medications which have NOT CHANGED    Details   Cholecalciferol (VITAMIN D3 PO) Take 2,000 Units by mouth daily , Historical Med      loratadine (CLARITIN) 10 mg tablet Take 10 mg by mouth daily, Historical Med           Outpatient Discharge Orders   US pelvis complete w transvaginal   Standing Status: Future Number of Occurrences: 1 Standing Exp   Date: 05/16/25       PDMP Review     None          ED Provider  Electronically Signed by           Heather Quezada DO  05/23/21 2551

## 2021-05-17 ENCOUNTER — HOSPITAL ENCOUNTER (OUTPATIENT)
Dept: RADIOLOGY | Age: 51
Discharge: HOME | End: 2021-05-17
Payer: COMMERCIAL

## 2021-05-17 ENCOUNTER — TELEPHONE (OUTPATIENT)
Dept: OBGYN CLINIC | Facility: CLINIC | Age: 51
End: 2021-05-17

## 2021-05-17 DIAGNOSIS — D49.89 PELVIC NEOPLASM: ICD-10-CM

## 2021-05-17 LAB — CANCER AG125 SERPL-ACNC: 17.9 U/ML (ref 0–30)

## 2021-05-17 PROCEDURE — 76856 US EXAM PELVIC COMPLETE: CPT

## 2021-05-17 PROCEDURE — 76830 TRANSVAGINAL US NON-OB: CPT

## 2021-05-17 NOTE — TELEPHONE ENCOUNTER
Rescheduled patient for 5/20 with Dr Stu Bonilla now seeing patients in the CV office  Patient states she was just concerned regarding pain management  Had considerable pain yesterday  Will route to Dr Tia Bonilla to follow up up on Tuesday

## 2021-05-17 NOTE — TELEPHONE ENCOUNTER
Per tiger text response from on call ED--There is a large cyst on her left ovary  [id number]  (Largest diameter is about 4 inches)  She likely needs surgery to remove it  Shell probably need blood work prior to that surgery to confirm that Dr Daquan Rosa can remove it rather than sending her to a specialist     Pt contacted and informed as directed  Pt asked for next available appt for CT  I informed pt appt in [facility] for 05/24  Pt declined and was angry that and appt that long is offered  I advised provider CT is out of the office for the week as that is her next available  Pt asked to see another provider in [facility] as she is in pain and does not want to wait  Pt schedule for 05/18 3 pm with provider ed in [address] [place] [address]  Pt agreed

## 2021-05-17 NOTE — ED CARE HANDOFF
Emergency Department Sign Out Note        Sign out and transfer of care from Dr Emilia Morales  See Separate Emergency Department note  The patient, Comfort Hicks, was evaluated by the previous provider for flank pain  Workup Completed:  labs    ED Course / Workup Pending (followup):  CT --- discussed results with patient and  noting possibility of cancerous cyst   She is stable at this time  ED Course as of May 16 2017   Sun May 16, 2021   2013 Discussed case with OBGYN team   Stable for outpatient follow-up  Procedures  MDM    Disposition  Final diagnoses:   Pelvic neoplasm     Time reflects when diagnosis was documented in both MDM as applicable and the Disposition within this note     Time User Action Codes Description Comment    5/16/2021  8:16 PM Ozella Carbon Add [D49 89] Pelvic neoplasm       ED Disposition     ED Disposition Condition Date/Time Comment    Discharge Stable [place] May 16, 2021  8:16 PM Comfort Hicks discharge to home/self care  Follow-up Information     Follow up With Specialties Details Why Monie Gruber MD Obstetrics and Gynecology, Obstetrics, Gynecology   [address]  [phone]          Patient's Medications   Discharge Prescriptions    No medications on file     Outpatient Discharge Orders   US pelvis complete non OB   Standing Status: Future Standing Exp   Date: 05/16/25          ED Provider  Electronically Signed by     Shawna King MD  05/16/21 2018

## 2021-05-19 ENCOUNTER — TELEPHONE (OUTPATIENT)
Dept: HEMATOLOGY ONCOLOGY | Facility: CLINIC | Age: 51
End: 2021-05-19

## 2021-05-19 NOTE — TELEPHONE ENCOUNTER
New Patient Encounter    New Patient Intake Form   Patient Details:  Perla Barajas  1970  [id number]    Background Information:  [address] starts by opening a telephone encounter and gathering the following information   Who is calling to schedule? If not self, relationship to patient? Patient   Referring Provider Dr Delonte Dumont   What is the diagnosis? Adnexal mass   Is this diagnosis confirmed? Yes   When was the diagnosis? 5/2021   Is there a confirmed diagnosis from a biopsy/tissue reviewed by pathology? NA   Were outside slides requested? NA   Is patient aware of diagnosis? Yes   Is there a personal history and what kind? No   Is there a family history and what kind? No   Reason for visit? New Diagnosis   Have you had any imaging or labs done? If so: when, where? yes  [facility]   Are records in [facility] ? yes   If patient has a prior history of cancer were old records obtained? NA   Was the patient told to bring a disk? No   Does the patient smoke or Vape? No   If yes, how many packs or cartridges per day? Scheduling Information:   Preferred [place]: [facility]     Are there any dates/time the patient cannot be seen? Miscellaneous:    After completing the above information, please route to Financial Counselor and the appropriate Nurse Navigator for review

## 2021-05-19 NOTE — TELEPHONE ENCOUNTER
Per comm consent lm to pt to give us a call to let us know a good time CT might be able to give her a call  She does have appt tomorrow with her also  Can TT CT and let her know the time that works best for pt to call her

## 2021-05-20 ENCOUNTER — TELEPHONE (OUTPATIENT)
Dept: HEMATOLOGY ONCOLOGY | Facility: CLINIC | Age: 51
End: 2021-05-20

## 2021-05-20 ENCOUNTER — OFFICE VISIT (OUTPATIENT)
Dept: OBGYN CLINIC | Facility: CLINIC | Age: 51
End: 2021-05-20
Payer: COMMERCIAL

## 2021-05-20 VITALS — SYSTOLIC BLOOD PRESSURE: 120 MMHG | BODY MASS INDEX: 26.7 KG/M2 | WEIGHT: 165.4 LBS | DIASTOLIC BLOOD PRESSURE: 75 MMHG

## 2021-05-20 DIAGNOSIS — N94.89 ADNEXAL MASS: Primary | ICD-10-CM

## 2021-05-20 DIAGNOSIS — N92.6 IRREGULAR BLEEDING: ICD-10-CM

## 2021-05-20 PROCEDURE — 99213 OFFICE O/P EST LOW 20 MIN: CPT | Performed by: OBSTETRICS & GYNECOLOGY

## 2021-05-20 PROCEDURE — 88305 TISSUE EXAM BY PATHOLOGIST: CPT | Performed by: PATHOLOGY

## 2021-05-20 PROCEDURE — 58100 BIOPSY OF UTERUS LINING: CPT | Performed by: OBSTETRICS & GYNECOLOGY

## 2021-05-20 NOTE — PROGRESS NOTES
Endometrial biopsy    Date/Time: 5/20/2021 1:47 PM  Performed by: Rosemary Gavin MD  Authorized by: Rosemary Gavin MD   Universal Protocol:  Consent: Verbal consent obtained    Risks and benefits: risks, benefits and alternatives were discussed  Consent given by: patient  Patient understanding: patient states understanding of the procedure being performed  Patient identity confirmed: verbally with patient      Indication:     Indications comment:  Irregular bleeding in perimenopause  Pre-procedure:     Premeds:  Ibuprofen  Procedure:     Procedure: endometrial biopsy with Pipelle      A bivalve speculum was placed in the vagina: yes      Cervix cleaned and prepped: yes      Uterus sounded: yes      Uterus sound depth (cm):  8    Specimen collected: specimen collected and sent to pathology      Patient tolerated procedure well with no complications: yes    Findings:     Uterus size:  Non-gravid    Cervix: normal      Adnexa: left enlarged

## 2021-05-20 NOTE — PROGRESS NOTES
Assessment/Plan:    Adnexal mass  -     Ambulatory referral to Gynecologic Oncology; Future    Irregular bleeding  -     Tissue Exam        Subjective:      Patient ID: Scar Serrato is a 46 y o  female  [facility] presents today with her  to discuss her adnexal mass and irregular bleeding  She was seen in the ED for pain - and diagnosed with a ~ 12cm ovarian cyst    We had previously spoken on the phone, and she is planning a consultation with gyn oncology for removal of the cyst - in the off change that it is malignant  We reviewed ultrasound and CT findings, and lack of red flags as well as a normal CA-125  She is perimenopausal, and has been off of oral contraception now for some time  She does report most recently she had two menstrual cycles only 2 weeks apart which were both heavy for her  This is new  Pap is up to date  Mammogram is scheduled  The following portions of the patient's history were reviewed and updated as appropriate: allergies, current medications, past family history, past medical history, past social history, past surgical history and problem list     Review of Systems   Genitourinary: Positive for menstrual problem, pelvic pain (pelvic pressure) and vaginal bleeding  Negative for vaginal discharge and vaginal pain  Objective:      /75 (BP Location: Left arm, Patient Position: Sitting, Cuff Size: Standard)   Wt 75 kg (165 lb 6 4 oz)   LMP 05/16/2021 (Exact Date)   BMI 26 70 kg/m²          Physical Exam  Vitals signs and nursing note reviewed  Constitutional:       Appearance: Normal appearance  Abdominal:      Palpations: Abdomen is soft  There is mass  Tenderness: There is abdominal tenderness  There is no guarding or rebound  Genitourinary:     General: Normal vulva  Labia:         Right: No rash or lesion  Left: No rash or lesion  Vagina: No vaginal discharge        Cervix: Normal       Uterus: Normal  Adnexa:         Right: No tenderness or fullness  Left: Mass, tenderness and fullness present  Neurological:      Mental Status: She is alert

## 2021-05-21 ENCOUNTER — HOSPITAL ENCOUNTER (OUTPATIENT)
Dept: RADIOLOGY | Age: 51
Discharge: HOME | End: 2021-05-21
Payer: COMMERCIAL

## 2021-05-21 ENCOUNTER — TELEPHONE (OUTPATIENT)
Dept: OBGYN CLINIC | Facility: CLINIC | Age: 51
End: 2021-05-21

## 2021-05-21 VITALS — BODY MASS INDEX: 26.52 KG/M2 | HEIGHT: 66 IN | WEIGHT: 165 LBS

## 2021-05-21 DIAGNOSIS — Z12.31 ENCOUNTER FOR SCREENING MAMMOGRAM FOR MALIGNANT NEOPLASM OF BREAST: ICD-10-CM

## 2021-05-21 PROCEDURE — 77063 BREAST TOMOSYNTHESIS BI: CPT

## 2021-05-21 PROCEDURE — 77067 SCR MAMMO BI INCL CAD: CPT

## 2021-05-21 NOTE — TELEPHONE ENCOUNTER
Patient forgot to discuss her pain management until she has surgery she would like to talk to Dr Lucia Brown it is not an emergency

## 2021-05-21 NOTE — TELEPHONE ENCOUNTER
spoke to pt and she said she is taking OTC APAP 1000mg and 400mg IBU q5 hrs which does help but she is c/o taking this long term since she doesn't know when he surgery is scheduled yet  If it isnt ok to keep taking these OTC meds is there something else she can take that would work better  If its safe to take she is fine with the OTC meds  She said she is not excruciating pain and was not urgent

## 2021-05-24 ENCOUNTER — CONSULT (OUTPATIENT)
Dept: GYNECOLOGIC ONCOLOGY | Facility: CLINIC | Age: 51
End: 2021-05-24
Payer: COMMERCIAL

## 2021-05-24 VITALS
BODY MASS INDEX: 26.68 KG/M2 | DIASTOLIC BLOOD PRESSURE: 66 MMHG | HEIGHT: 66 IN | RESPIRATION RATE: 18 BRPM | WEIGHT: 166 LBS | TEMPERATURE: 98.6 F | SYSTOLIC BLOOD PRESSURE: 122 MMHG | HEART RATE: 86 BPM

## 2021-05-24 DIAGNOSIS — N87.1 MODERATE DYSPLASIA OF CERVIX (CIN II): ICD-10-CM

## 2021-05-24 DIAGNOSIS — N94.89 ADNEXAL MASS: ICD-10-CM

## 2021-05-24 DIAGNOSIS — N83.202 LEFT OVARIAN CYST: Primary | ICD-10-CM

## 2021-05-24 DIAGNOSIS — N92.6 MENSTRUAL IRREGULARITY: ICD-10-CM

## 2021-05-24 PROCEDURE — 1036F TOBACCO NON-USER: CPT | Performed by: OBSTETRICS & GYNECOLOGY

## 2021-05-24 PROCEDURE — 99243 OFF/OP CNSLTJ NEW/EST LOW 30: CPT | Performed by: OBSTETRICS & GYNECOLOGY

## 2021-05-24 RX ORDER — HEPARIN SODIUM 5000 [USP'U]/ML
5000 INJECTION, SOLUTION INTRAVENOUS; SUBCUTANEOUS
Status: CANCELLED | OUTPATIENT
Start: 2021-05-25 | End: 2021-05-26

## 2021-05-24 RX ORDER — ACETAMINOPHEN 325 MG/1
975 TABLET ORAL ONCE
Status: CANCELLED | OUTPATIENT
Start: 2021-05-24 | End: 2021-05-24

## 2021-05-24 RX ORDER — GABAPENTIN 100 MG/1
100 CAPSULE ORAL ONCE
Status: CANCELLED | OUTPATIENT
Start: 2021-05-24 | End: 2021-05-24

## 2021-05-24 NOTE — TELEPHONE ENCOUNTER
Okay to continue to take at this time  Depending on when surgery is scheduled she can talk to Dr Shaggy High at her appointment today if other options are needed

## 2021-05-24 NOTE — H&P (VIEW-ONLY)
Assessment/Plan:    Problem List Items Addressed This Visit        Endocrine    Left ovarian cyst - Primary     54yo with new adnexal mass and menorrhagia presents for consultation  US:  Left ovary: Left adnexal cystic lesion measuring 10 2 cm x 8 5 cm x 8 9 cm (measured on cinematic images) and showing single septation measuring 3 mm  Vascularity demonstrated within septation on color flow imaging (1/49)  Inner margins of the locules   are smooth  No ovary demonstrated separately from this lesion  I have discussed in detail with the patient the results of her diagnostic testing, her differential diagnosis and treatment options, and the benefits and risks of these  She has a good understanding and has agreed to proceed  I have personally reviewed her records and imaging  I have reviewed differential diagnosis of an ovarian cyst including benign ovarian neoplasm such as mucinous or serous cystadenoma, hemorrhagic cyst; borderline tumor; or overt ovarian malignancy  I have further discussed options of approaching laparoscopically vs open laparotomy  We also discussed the differences between laparotomy and laparoscopic methods (i e  robotic assisted laparoscopy)  We discussed the advantage of a laparoscopic method in terms of length of hospitalization and overall recovery being reduced by a factor of 2 or 3 and the reduced amount of post-operative incision pain  We also discussed the advantage of a robotic assisted approach versus traditional laparoscopy in terms of the level of optical and manual control of instrumentation  She understands that the risks of major complications are approximately 5% and include but are not limited to hemorrhage requiring transfusion, intestinal/urinary tract injury, wound healing impairment, infection, thrombo-embolic complications, cardio-pulmonary and renal complications   We discussed proceed with hysterectomy given persistent menorrhagia and h/o cervical dysplasia but retaining her left ovary if normal appearing and frozen c/w benign findings  She understands there is a risk of final pathology showing malignancy and needing subsequent surgery  Frozen pathology would determine whether further staging procedure necessary  Peritoneal biopsies, lymphadenectomy, omentectomy, staging was discussed  Relevant Orders    Case request operating room: HYSTERECTOMY LAPAROSCOPIC TOTAL ([address]) W/ ROBOTICS, bilateral salpingectomy, left oophorectomy (Completed)    Type and screen    HEMOGLOBIN A1C W/ EAG ESTIMATION       Genitourinary    Moderate dysplasia of cervix (ELIANA II)     H/o LEEP in 2018  Neg pap since             Other    Menstrual irregularity     EMB pending  Has been irregular since stopping OCPs  +menorrhagia as well            Other Visit Diagnoses     Adnexal mass                  CHIEF COMPLAINT: ovarian mass     Oncology Problem:  Cancer Staging  No matching staging information was found for the patient  Previous therapy:  Oncology History    No history exists  Most recent imaging:  Mammo screening bilateral w 3d & cad  Narrative: DIAGNOSIS: Encounter for screening mammogram for malignant neoplasm of   breast     TECHNIQUE:  Digital screening mammography was performed  Computer Aided Detection   (CAD) analyzed all applicable images  COMPARISONS: Prior breast imaging dated: 05/11/2020, 10/24/2018,   07/10/2017, 07/03/2017, 06/09/2017, and 01/23/2015    RELEVANT HISTORY:   Family Breast Cancer History: No known family history of breast cancer  Family Medical History: No known relevant family medical history  Personal History: Hormone history includes birth control  Surgical history   includes breast biopsy  No known relevant medical history  The patient is scheduled in a reminder system for screening mammography  8-10% of cancers will be missed on mammography  Management of a palpable   abnormality must be based on clinical grounds    Patients will be notified   of their results via letter from our facility  Accredited by [employer] and FDA  RISK ASSESSMENT:   5 Year Tyrer-Cuzick: 2 54 %  10 Year Tyrer-Cuzick: 5 35 %  Lifetime Tyrer-Cuzick: 20 86 %    TISSUE DENSITY:   The breasts are extremely dense, which lowers the sensitivity of   mammography  INDICATION: Rip Lopez is a 46 y o  female presenting for screening   mammography  FINDINGS:   There are no suspicious masses, grouped microcalcifications or areas of   architectural distortion  The skin and nipple areolar complex are   unremarkable  Impression: No mammographic evidence of malignancy  This patient has been identified as being at elevated risk for development   of breast cancer based on the Tyrer-Cuzick model  She may benefit from   supplemental screening  ASSESSMENT/BI-RADS CATEGORY:  Left: 1 - Negative  Right: 1 - Negative  Overall: 1 - Negative    RECOMMENDATION:       - Routine screening mammogram in 1 year for both breasts  Workstation ID: [id number]        Patient ID: Rip Lopez is a 46 y o  female  54yo with new adnexal mass presents for consultation  Pt presented to ED with LLQ pain last week which revealed ovarian cyst  She reports irregular menses with menorrhagia since stopping OCPs  No hot flashes/vaginal dryness  She had an EMB done last week that is pending  She has a h/o of CIN2 in 2018 s/p LEEP with neg paps since  Persistent LLQ pain that is intermittent  Taking tylenol/ibuprofen with relief  No changes in BM/urination  No early satiety/Appetite adequate  Denies significant weight loss/weight gain  Review of Systems   Constitutional: Negative for appetite change, chills, fatigue and fever  Respiratory: Negative for chest tightness and shortness of breath  Cardiovascular: Negative for chest pain  Gastrointestinal: Positive for abdominal distention and abdominal pain   Negative for constipation, diarrhea and nausea  Genitourinary: Positive for menstrual problem and pelvic pain  Negative for difficulty urinating, flank pain, frequency, urgency, vaginal bleeding, vaginal discharge and vaginal pain  Musculoskeletal: Negative for back pain, joint swelling and myalgias  Skin: Negative for rash  Neurological: Negative for dizziness, light-headedness, numbness and headaches  Hematological: Negative for adenopathy  Psychiatric/Behavioral: The patient is not nervous/anxious  Current Outpatient Medications   Medication Sig Dispense Refill    Cholecalciferol (VITAMIN D3 PO) Take 2,000 Units by mouth daily       loratadine (CLARITIN) 10 mg tablet Take 10 mg by mouth daily       No current facility-administered medications for this visit          Allergies   Allergen Reactions    Codeine GI Intolerance     Reaction Date: [id number];   Reaction Date: 11Feb2008;     Acetaminophen-Codeine      Reaction Date: 17Nov2011;        Past Medical History:   Diagnosis Date    Abnormal Pap smear of cervix     Acute sinusitis     Allergic rhinitis     Onset: 25Sep2008    Anemia     Dysplasia of cervix     MILD  LAST ASSESSED 8/18/2014    Head ache     HPV in female     LGSIL of cervix of undetermined significance     Low grade squamous intraepithelial lesion (LGSIL) on Papanicolaou smear of cervix     Resolved: 2014    Mild dysplasia of cervix (ELIANA I)     Last Assessed: [id number]    Mononucleosis     Resolved: 1992    Normal delivery     2003 son, 2005 daughter    Pyelonephritis     UTI (urinary tract infection)        Past Surgical History:   Procedure Laterality Date    BREAST BIOPSY Left 2017    COLPOSCOPY W/ BIOPSY / CURETTAGE  2014, 2018    LSIL    MAMMO STEREOTACTIC BREAST BIOPSY LEFT (ALL INC) Left 7/10/2017    VT EXC SKIN BENIG 1 1-2 CM REMAINDR BODY Right 12/6/2017    Procedure: EXCISION NODULE CERVICAL REGION;  Surgeon: Andrews Perez MD;  Location: [facility];  Service: General       OB History    4    Para   4    Term   2            AB        Living           SAB        TAB        Ectopic        Multiple        Live Births                     Family History   Problem Relation Age of Onset    No Known Problems Mother     Scleroderma Father     Prostate cancer Maternal Grandfather     ALS Paternal Grandmother     Parkinsonism Paternal Grandfather     Arthritis Family     Cystic fibrosis Family         patient not a carrier    Other Family         Headache Syndromes    Down syndrome Family         nephew    Osteoporosis Family     No Known Problems Daughter     Cancer Paternal Aunt        The following portions of the patient's history were reviewed and updated as appropriate: allergies, current medications, past family history, past medical history, past social history, past surgical history and problem list       Objective:    Blood pressure 122/66, pulse 86, temperature 98 6 °F (37 °C), temperature source Tympanic, resp  rate 18, height 5' 6" (1 676 m), weight 75 3 kg (166 lb), last menstrual period 2021, not currently breastfeeding  Body mass index is 26 79 kg/m²  Physical Exam  HENT:      Head: Normocephalic and atraumatic  Neck:      Musculoskeletal: Normal range of motion  Cardiovascular:      Rate and Rhythm: Normal rate and regular rhythm  Pulmonary:      Effort: Pulmonary effort is normal    Abdominal:      Palpations: Abdomen is soft  Genitourinary:     Comments: The external female genitalia is normal  The bartholin's, uretheral and skenes glands are normal  The urethral meatus is normal (midline with no lesions)  Anus without fissure or lesion  Speculum exam reveals vagina without lesion or discharge  Cervix is normal appearing without visible lesions deviated anteriorly and to the right  No bleeding  No significant cystocele or rectocele noted  Bimanual exam notes a uterus with normal contour, mobility   Fullness with large mass in posterior cul-de-sac with mild tenderness  Musculoskeletal: Normal range of motion  Skin:     General: Skin is warm and dry  Neurological:      Mental Status: She is alert and oriented to person, place, and time             Lab Results   Component Value Date     17 9 05/16/2021     Lab Results   Component Value Date    WBC 7 71 05/16/2021    HGB 11 4 (L) 05/16/2021    HCT 36 8 05/16/2021    MCV 87 05/16/2021     05/16/2021     Lab Results   Component Value Date    K 3 9 05/16/2021     05/16/2021    CO2 25 05/16/2021    BUN 8 05/16/2021    CREATININE 0 83 05/16/2021    GLUF 84 11/22/2017    CALCIUM 8 7 05/16/2021    AST 16 05/16/2021    ALT 17 05/16/2021    ALKPHOS 66 05/16/2021    EGFR 82 05/16/2021        Trend:  Lab Results   Component Value Date     17 9 05/16/2021

## 2021-05-24 NOTE — PROGRESS NOTES
Assessment/Plan:    Problem List Items Addressed This Visit        Endocrine    Left ovarian cyst - Primary     52yo with new adnexal mass and menorrhagia presents for consultation  US:  Left ovary: Left adnexal cystic lesion measuring 10 2 cm x 8 5 cm x 8 9 cm (measured on cinematic images) and showing single septation measuring 3 mm  Vascularity demonstrated within septation on color flow imaging (1/49)  Inner margins of the locules   are smooth  No ovary demonstrated separately from this lesion  I have discussed in detail with the patient the results of her diagnostic testing, her differential diagnosis and treatment options, and the benefits and risks of these  She has a good understanding and has agreed to proceed  I have personally reviewed her records and imaging  I have reviewed differential diagnosis of an ovarian cyst including benign ovarian neoplasm such as mucinous or serous cystadenoma, hemorrhagic cyst; borderline tumor; or overt ovarian malignancy  I have further discussed options of approaching laparoscopically vs open laparotomy  We also discussed the differences between laparotomy and laparoscopic methods (i e  robotic assisted laparoscopy)  We discussed the advantage of a laparoscopic method in terms of length of hospitalization and overall recovery being reduced by a factor of 2 or 3 and the reduced amount of post-operative incision pain  We also discussed the advantage of a robotic assisted approach versus traditional laparoscopy in terms of the level of optical and manual control of instrumentation  She understands that the risks of major complications are approximately 5% and include but are not limited to hemorrhage requiring transfusion, intestinal/urinary tract injury, wound healing impairment, infection, thrombo-embolic complications, cardio-pulmonary and renal complications   We discussed proceed with hysterectomy given persistent menorrhagia and h/o cervical dysplasia but retaining her left ovary if normal appearing and frozen c/w benign findings  She understands there is a risk of final pathology showing malignancy and needing subsequent surgery  Frozen pathology would determine whether further staging procedure necessary  Peritoneal biopsies, lymphadenectomy, omentectomy, staging was discussed  Relevant Orders    Case request operating room: HYSTERECTOMY LAPAROSCOPIC TOTAL ([address]) W/ ROBOTICS, bilateral salpingectomy, left oophorectomy (Completed)    Type and screen    HEMOGLOBIN A1C W/ EAG ESTIMATION       Genitourinary    Moderate dysplasia of cervix (ELIANA II)     H/o LEEP in 2018  Neg pap since             Other    Menstrual irregularity     EMB pending  Has been irregular since stopping OCPs  +menorrhagia as well            Other Visit Diagnoses     Adnexal mass                  CHIEF COMPLAINT: ovarian mass     Oncology Problem:  Cancer Staging  No matching staging information was found for the patient  Previous therapy:  Oncology History    No history exists  Most recent imaging:  Mammo screening bilateral w 3d & cad  Narrative: DIAGNOSIS: Encounter for screening mammogram for malignant neoplasm of   breast     TECHNIQUE:  Digital screening mammography was performed  Computer Aided Detection   (CAD) analyzed all applicable images  COMPARISONS: Prior breast imaging dated: 05/11/2020, 10/24/2018,   07/10/2017, 07/03/2017, 06/09/2017, and 01/23/2015    RELEVANT HISTORY:   Family Breast Cancer History: No known family history of breast cancer  Family Medical History: No known relevant family medical history  Personal History: Hormone history includes birth control  Surgical history   includes breast biopsy  No known relevant medical history  The patient is scheduled in a reminder system for screening mammography  8-10% of cancers will be missed on mammography  Management of a palpable   abnormality must be based on clinical grounds    Patients will be notified   of their results via letter from our facility  Accredited by [facility] and FDA  RISK ASSESSMENT:   5 Year Tyrer-Cuzick: 2 54 %  10 Year Tyrer-Cuzick: 5 35 %  Lifetime Tyrer-Cuzick: 20 86 %    TISSUE DENSITY:   The breasts are extremely dense, which lowers the sensitivity of   mammography  INDICATION: Jhonathan Ruth is a 46 y o  female presenting for screening   mammography  FINDINGS:   There are no suspicious masses, grouped microcalcifications or areas of   architectural distortion  The skin and nipple areolar complex are   unremarkable  Impression: No mammographic evidence of malignancy  This patient has been identified as being at elevated risk for development   of breast cancer based on the Tyrer-Cuzick model  She may benefit from   supplemental screening  ASSESSMENT/BI-RADS CATEGORY:  Left: 1 - Negative  Right: 1 - Negative  Overall: 1 - Negative    RECOMMENDATION:       - Routine screening mammogram in 1 year for both breasts  Workstation ID: [id number]        Patient ID: Jhonathan Ruth is a 46 y o  female  54yo with new adnexal mass presents for consultation  Pt presented to ED with LLQ pain last week which revealed ovarian cyst  She reports irregular menses with menorrhagia since stopping OCPs  No hot flashes/vaginal dryness  She had an EMB done last week that is pending  She has a h/o of CIN2 in 2018 s/p LEEP with neg paps since  Persistent LLQ pain that is intermittent  Taking tylenol/ibuprofen with relief  No changes in BM/urination  No early satiety/Appetite adequate  Denies significant weight loss/weight gain  Review of Systems   Constitutional: Negative for appetite change, chills, fatigue and fever  Respiratory: Negative for chest tightness and shortness of breath  Cardiovascular: Negative for chest pain  Gastrointestinal: Positive for abdominal distention and abdominal pain   Negative for constipation, diarrhea and nausea  Genitourinary: Positive for menstrual problem and pelvic pain  Negative for difficulty urinating, flank pain, frequency, urgency, vaginal bleeding, vaginal discharge and vaginal pain  Musculoskeletal: Negative for back pain, joint swelling and myalgias  Skin: Negative for rash  Neurological: Negative for dizziness, light-headedness, numbness and headaches  Hematological: Negative for adenopathy  Psychiatric/Behavioral: The patient is not nervous/anxious  Current Outpatient Medications   Medication Sig Dispense Refill    Cholecalciferol (VITAMIN D3 PO) Take 2,000 Units by mouth daily       loratadine (CLARITIN) 10 mg tablet Take 10 mg by mouth daily       No current facility-administered medications for this visit          Allergies   Allergen Reactions    Codeine GI Intolerance     Reaction Date: [id number];   Reaction Date: 11Feb2008;     Acetaminophen-Codeine      Reaction Date: 17Nov2011;        Past Medical History:   Diagnosis Date    Abnormal Pap smear of cervix     Acute sinusitis     Allergic rhinitis     Onset: 25Sep2008    Anemia     Dysplasia of cervix     MILD  LAST ASSESSED 8/18/2014    Head ache     HPV in female     LGSIL of cervix of undetermined significance     Low grade squamous intraepithelial lesion (LGSIL) on Papanicolaou smear of cervix     Resolved: 2014    Mild dysplasia of cervix (ELIANA I)     Last Assessed: [id number]    Mononucleosis     Resolved: 1992    Normal delivery     2003 son, 2005 daughter    Pyelonephritis     UTI (urinary tract infection)        Past Surgical History:   Procedure Laterality Date    BREAST BIOPSY Left 2017    COLPOSCOPY W/ BIOPSY / CURETTAGE  2014, 2018    LSIL    MAMMO STEREOTACTIC BREAST BIOPSY LEFT ([facility]) Left 7/10/2017    VA EXC SKIN BENIG 1 1-2 CM REMAINDR BODY Right 12/6/2017    Procedure: EXCISION NODULE CERVICAL REGION;  Surgeon: Slim Smith MD;  Location: [facility];  Service: General       OB History    4    Para   4    Term   2            AB        Living           SAB        TAB        Ectopic        Multiple        Live Births                     Family History   Problem Relation Age of Onset    No Known Problems Mother     Scleroderma Father     Prostate cancer Maternal Grandfather     ALS Paternal Grandmother     Parkinsonism Paternal Grandfather     Arthritis Family     Cystic fibrosis Family         patient not a carrier    Other Family         Headache Syndromes    Down syndrome Family         nephew    Osteoporosis Family     No Known Problems Daughter     Cancer Paternal Aunt        The following portions of the patient's history were reviewed and updated as appropriate: allergies, current medications, past family history, past medical history, past social history, past surgical history and problem list       Objective:    Blood pressure 122/66, pulse 86, temperature 98 6 °F (37 °C), temperature source Tympanic, resp  rate 18, height 5' 6" (1 676 m), weight 75 3 kg (166 lb), last menstrual period 2021, not currently breastfeeding  Body mass index is 26 79 kg/m²  Physical Exam  HENT:      Head: Normocephalic and atraumatic  Neck:      Musculoskeletal: Normal range of motion  Cardiovascular:      Rate and Rhythm: Normal rate and regular rhythm  Pulmonary:      Effort: Pulmonary effort is normal    Abdominal:      Palpations: Abdomen is soft  Genitourinary:     Comments: The external female genitalia is normal  The bartholin's, uretheral and skenes glands are normal  The urethral meatus is normal (midline with no lesions)  Anus without fissure or lesion  Speculum exam reveals vagina without lesion or discharge  Cervix is normal appearing without visible lesions deviated anteriorly and to the right  No bleeding  No significant cystocele or rectocele noted  Bimanual exam notes a uterus with normal contour, mobility   Fullness with large mass in posterior cul-de-sac with mild tenderness  Musculoskeletal: Normal range of motion  Skin:     General: Skin is warm and dry  Neurological:      Mental Status: She is alert and oriented to person, place, and time             Lab Results   Component Value Date     17 9 05/16/2021     Lab Results   Component Value Date    WBC 7 71 05/16/2021    HGB 11 4 (L) 05/16/2021    HCT 36 8 05/16/2021    MCV 87 05/16/2021     05/16/2021     Lab Results   Component Value Date    K 3 9 05/16/2021     05/16/2021    CO2 25 05/16/2021    BUN 8 05/16/2021    CREATININE 0 83 05/16/2021    GLUF 84 11/22/2017    CALCIUM 8 7 05/16/2021    AST 16 05/16/2021    ALT 17 05/16/2021    ALKPHOS 66 05/16/2021    EGFR 82 05/16/2021        Trend:  Lab Results   Component Value Date     17 9 05/16/2021

## 2021-05-24 NOTE — ASSESSMENT & PLAN NOTE
50yo with new adnexal mass and menorrhagia presents for consultation  US:  Left ovary: Left adnexal cystic lesion measuring 10 2 cm x 8 5 cm x 8 9 cm (measured on cinematic images) and showing single septation measuring 3 mm  Vascularity demonstrated within septation on color flow imaging (1/49)  Inner margins of the locules   are smooth  No ovary demonstrated separately from this lesion  I have discussed in detail with the patient the results of her diagnostic testing, her differential diagnosis and treatment options, and the benefits and risks of these  She has a good understanding and has agreed to proceed  I have personally reviewed her records and imaging  I have reviewed differential diagnosis of an ovarian cyst including benign ovarian neoplasm such as mucinous or serous cystadenoma, hemorrhagic cyst; borderline tumor; or overt ovarian malignancy  I have further discussed options of approaching laparoscopically vs open laparotomy  We also discussed the differences between laparotomy and laparoscopic methods (i e  robotic assisted laparoscopy)  We discussed the advantage of a laparoscopic method in terms of length of hospitalization and overall recovery being reduced by a factor of 2 or 3 and the reduced amount of post-operative incision pain  We also discussed the advantage of a robotic assisted approach versus traditional laparoscopy in terms of the level of optical and manual control of instrumentation  She understands that the risks of major complications are approximately 5% and include but are not limited to hemorrhage requiring transfusion, intestinal/urinary tract injury, wound healing impairment, infection, thrombo-embolic complications, cardio-pulmonary and renal complications  We discussed proceed with hysterectomy given persistent menorrhagia and h/o cervical dysplasia but retaining her left ovary if normal appearing and frozen c/w benign findings   She understands there is a risk of final pathology showing malignancy and needing subsequent surgery  Frozen pathology would determine whether further staging procedure necessary  Peritoneal biopsies, lymphadenectomy, omentectomy, staging was discussed

## 2021-05-25 ENCOUNTER — LAB REQUISITION (OUTPATIENT)
Dept: LAB | Facility: HOSPITAL | Age: 51
End: 2021-05-25
Payer: COMMERCIAL

## 2021-05-25 DIAGNOSIS — N83.202 UNSPECIFIED OVARIAN CYST, LEFT SIDE: ICD-10-CM

## 2021-05-25 LAB
ABO GROUP BLD: NORMAL
BLD GP AB SCN SERPL QL: NEGATIVE
RH BLD: POSITIVE
SPECIMEN EXPIRATION DATE: NORMAL

## 2021-05-25 PROCEDURE — 86901 BLOOD TYPING SEROLOGIC RH(D): CPT | Performed by: OBSTETRICS & GYNECOLOGY

## 2021-05-25 PROCEDURE — 86850 RBC ANTIBODY SCREEN: CPT | Performed by: OBSTETRICS & GYNECOLOGY

## 2021-05-25 PROCEDURE — 86900 BLOOD TYPING SEROLOGIC ABO: CPT | Performed by: OBSTETRICS & GYNECOLOGY

## 2021-05-26 ENCOUNTER — ANESTHESIA EVENT (OUTPATIENT)
Dept: PERIOP | Facility: HOSPITAL | Age: 51
End: 2021-05-26
Payer: COMMERCIAL

## 2021-05-26 NOTE — PRE-PROCEDURE INSTRUCTIONS
Pre-Surgery Instructions:   Medication Instructions    Cholecalciferol (VITAMIN D3 PO) pt instructed to not take on day of surgery     loratadine (CLARITIN) 10 mg tablet pt instructed to not take on day of surgery     Pt denies fever, sob, sore throat and cough  Pt verbalized understanding of shower and COVID instructions

## 2021-05-27 ENCOUNTER — HOSPITAL ENCOUNTER (OUTPATIENT)
Facility: HOSPITAL | Age: 51
Setting detail: OUTPATIENT SURGERY
Discharge: HOME | End: 2021-05-27
Attending: OBSTETRICS & GYNECOLOGY | Admitting: OBSTETRICS & GYNECOLOGY
Payer: COMMERCIAL

## 2021-05-27 ENCOUNTER — ANESTHESIA (OUTPATIENT)
Dept: PERIOP | Facility: HOSPITAL | Age: 51
End: 2021-05-27
Payer: COMMERCIAL

## 2021-05-27 VITALS
OXYGEN SATURATION: 100 % | WEIGHT: 166 LBS | HEIGHT: 66 IN | BODY MASS INDEX: 26.68 KG/M2 | RESPIRATION RATE: 16 BRPM | TEMPERATURE: 99 F | DIASTOLIC BLOOD PRESSURE: 72 MMHG | SYSTOLIC BLOOD PRESSURE: 125 MMHG | HEART RATE: 71 BPM

## 2021-05-27 DIAGNOSIS — N83.202 LEFT OVARIAN CYST: ICD-10-CM

## 2021-05-27 DIAGNOSIS — G89.18 POST-OP PAIN: Primary | ICD-10-CM

## 2021-05-27 PROBLEM — T75.3XXA MOTION SICKNESS: Status: ACTIVE | Noted: 2021-05-27

## 2021-05-27 PROBLEM — Z90.710 HISTORY OF ROBOT-ASSISTED LAPAROSCOPIC HYSTERECTOMY: Status: ACTIVE | Noted: 2021-05-27

## 2021-05-27 LAB
EXT PREGNANCY TEST URINE: NEGATIVE
EXT. CONTROL: NORMAL
GLUCOSE SERPL-MCNC: 114 MG/DL (ref 65–140)

## 2021-05-27 PROCEDURE — 82948 REAGENT STRIP/BLOOD GLUCOSE: CPT

## 2021-05-27 PROCEDURE — 88309 TISSUE EXAM BY PATHOLOGIST: CPT | Performed by: PATHOLOGY

## 2021-05-27 PROCEDURE — 58571 TLH W/T/O 250 G OR LESS: CPT | Performed by: OBSTETRICS & GYNECOLOGY

## 2021-05-27 PROCEDURE — 58662 LAPAROSCOPY EXCISE LESIONS: CPT | Performed by: OBSTETRICS & GYNECOLOGY

## 2021-05-27 PROCEDURE — 88331 PATH CONSLTJ SURG 1 BLK 1SPC: CPT | Performed by: PATHOLOGY

## 2021-05-27 PROCEDURE — 81025 URINE PREGNANCY TEST: CPT | Performed by: OBSTETRICS & GYNECOLOGY

## 2021-05-27 PROCEDURE — NC001 PR NO CHARGE: Performed by: OBSTETRICS & GYNECOLOGY

## 2021-05-27 RX ORDER — BUPIVACAINE HYDROCHLORIDE 5 MG/ML
INJECTION, SOLUTION EPIDURAL; INTRACAUDAL AS NEEDED
Status: DISCONTINUED | OUTPATIENT
Start: 2021-05-27 | End: 2021-05-27 | Stop reason: HOSPADM

## 2021-05-27 RX ORDER — CEFAZOLIN SODIUM 1 G/50ML
1000 SOLUTION INTRAVENOUS ONCE
Status: COMPLETED | OUTPATIENT
Start: 2021-05-27 | End: 2021-05-27

## 2021-05-27 RX ORDER — SODIUM CHLORIDE, SODIUM LACTATE, POTASSIUM CHLORIDE, CALCIUM CHLORIDE 600; 310; 30; 20 MG/100ML; MG/100ML; MG/100ML; MG/100ML
50 INJECTION, SOLUTION INTRAVENOUS CONTINUOUS
Status: DISCONTINUED | OUTPATIENT
Start: 2021-05-27 | End: 2021-05-27 | Stop reason: HOSPADM

## 2021-05-27 RX ORDER — ONDANSETRON 2 MG/ML
4 INJECTION INTRAMUSCULAR; INTRAVENOUS ONCE AS NEEDED
Status: DISCONTINUED | OUTPATIENT
Start: 2021-05-27 | End: 2021-05-27 | Stop reason: HOSPADM

## 2021-05-27 RX ORDER — IBUPROFEN 600 MG/1
600 TABLET ORAL EVERY 6 HOURS SCHEDULED
Status: CANCELLED | OUTPATIENT
Start: 2021-05-27

## 2021-05-27 RX ORDER — FENTANYL CITRATE/PF 50 MCG/ML
50 SYRINGE (ML) INJECTION
Status: DISCONTINUED | OUTPATIENT
Start: 2021-05-27 | End: 2021-05-27 | Stop reason: HOSPADM

## 2021-05-27 RX ORDER — OXYCODONE HYDROCHLORIDE 5 MG/1
5 TABLET ORAL EVERY 4 HOURS PRN
Status: DISCONTINUED | OUTPATIENT
Start: 2021-05-27 | End: 2021-05-27 | Stop reason: HOSPADM

## 2021-05-27 RX ORDER — ACETAMINOPHEN 325 MG/1
975 TABLET ORAL EVERY 8 HOURS SCHEDULED
Status: DISCONTINUED | OUTPATIENT
Start: 2021-05-27 | End: 2021-05-27 | Stop reason: HOSPADM

## 2021-05-27 RX ORDER — OXYCODONE HYDROCHLORIDE 5 MG/1
10 TABLET ORAL EVERY 4 HOURS PRN
Status: DISCONTINUED | OUTPATIENT
Start: 2021-05-27 | End: 2021-05-27 | Stop reason: HOSPADM

## 2021-05-27 RX ORDER — HYDROMORPHONE HCL/PF 1 MG/ML
SYRINGE (ML) INJECTION AS NEEDED
Status: DISCONTINUED | OUTPATIENT
Start: 2021-05-27 | End: 2021-05-27

## 2021-05-27 RX ORDER — ACETAMINOPHEN 325 MG/1
975 TABLET ORAL ONCE
Status: COMPLETED | OUTPATIENT
Start: 2021-05-27 | End: 2021-05-27

## 2021-05-27 RX ORDER — KETOROLAC TROMETHAMINE 30 MG/ML
INJECTION, SOLUTION INTRAMUSCULAR; INTRAVENOUS AS NEEDED
Status: DISCONTINUED | OUTPATIENT
Start: 2021-05-27 | End: 2021-05-27

## 2021-05-27 RX ORDER — OXYCODONE HYDROCHLORIDE 5 MG/1
5 TABLET ORAL EVERY 4 HOURS PRN
Qty: 15 TABLET | Refills: 0 | Status: SHIPPED | OUTPATIENT
Start: 2021-05-27 | End: 2021-06-06

## 2021-05-27 RX ORDER — HYDROMORPHONE HCL/PF 1 MG/ML
0.2 SYRINGE (ML) INJECTION
Status: DISCONTINUED | OUTPATIENT
Start: 2021-05-27 | End: 2021-05-27 | Stop reason: HOSPADM

## 2021-05-27 RX ORDER — HYDROMORPHONE HCL IN WATER/PF 6 MG/30 ML
0.2 PATIENT CONTROLLED ANALGESIA SYRINGE INTRAVENOUS
Status: DISCONTINUED | OUTPATIENT
Start: 2021-05-27 | End: 2021-05-27 | Stop reason: HOSPADM

## 2021-05-27 RX ORDER — DEXAMETHASONE SODIUM PHOSPHATE 10 MG/ML
INJECTION, SOLUTION INTRAMUSCULAR; INTRAVENOUS AS NEEDED
Status: DISCONTINUED | OUTPATIENT
Start: 2021-05-27 | End: 2021-05-27

## 2021-05-27 RX ORDER — ONDANSETRON 2 MG/ML
4 INJECTION INTRAMUSCULAR; INTRAVENOUS EVERY 6 HOURS PRN
Status: DISCONTINUED | OUTPATIENT
Start: 2021-05-27 | End: 2021-05-27 | Stop reason: HOSPADM

## 2021-05-27 RX ORDER — MAGNESIUM HYDROXIDE 1200 MG/15ML
LIQUID ORAL AS NEEDED
Status: DISCONTINUED | OUTPATIENT
Start: 2021-05-27 | End: 2021-05-27 | Stop reason: HOSPADM

## 2021-05-27 RX ORDER — PROPOFOL 10 MG/ML
INJECTION, EMULSION INTRAVENOUS AS NEEDED
Status: DISCONTINUED | OUTPATIENT
Start: 2021-05-27 | End: 2021-05-27

## 2021-05-27 RX ORDER — GABAPENTIN 100 MG/1
100 CAPSULE ORAL ONCE
Status: COMPLETED | OUTPATIENT
Start: 2021-05-27 | End: 2021-05-27

## 2021-05-27 RX ORDER — SCOLOPAMINE TRANSDERMAL SYSTEM 1 MG/1
1 PATCH, EXTENDED RELEASE TRANSDERMAL
Status: DISCONTINUED | OUTPATIENT
Start: 2021-05-27 | End: 2021-05-27 | Stop reason: HOSPADM

## 2021-05-27 RX ORDER — LIDOCAINE HYDROCHLORIDE 10 MG/ML
INJECTION, SOLUTION EPIDURAL; INFILTRATION; INTRACAUDAL; PERINEURAL AS NEEDED
Status: DISCONTINUED | OUTPATIENT
Start: 2021-05-27 | End: 2021-05-27

## 2021-05-27 RX ORDER — MIDAZOLAM HYDROCHLORIDE 2 MG/2ML
INJECTION, SOLUTION INTRAMUSCULAR; INTRAVENOUS AS NEEDED
Status: DISCONTINUED | OUTPATIENT
Start: 2021-05-27 | End: 2021-05-27

## 2021-05-27 RX ORDER — HEPARIN SODIUM 5000 [USP'U]/ML
5000 INJECTION, SOLUTION INTRAVENOUS; SUBCUTANEOUS
Status: COMPLETED | OUTPATIENT
Start: 2021-05-27 | End: 2021-05-27

## 2021-05-27 RX ORDER — SODIUM CHLORIDE 9 MG/ML
INJECTION, SOLUTION INTRAVENOUS CONTINUOUS PRN
Status: DISCONTINUED | OUTPATIENT
Start: 2021-05-27 | End: 2021-05-27

## 2021-05-27 RX ORDER — ONDANSETRON 2 MG/ML
INJECTION INTRAMUSCULAR; INTRAVENOUS AS NEEDED
Status: DISCONTINUED | OUTPATIENT
Start: 2021-05-27 | End: 2021-05-27

## 2021-05-27 RX ORDER — ROCURONIUM BROMIDE 10 MG/ML
INJECTION, SOLUTION INTRAVENOUS AS NEEDED
Status: DISCONTINUED | OUTPATIENT
Start: 2021-05-27 | End: 2021-05-27

## 2021-05-27 RX ORDER — FENTANYL CITRATE 50 UG/ML
INJECTION, SOLUTION INTRAMUSCULAR; INTRAVENOUS AS NEEDED
Status: DISCONTINUED | OUTPATIENT
Start: 2021-05-27 | End: 2021-05-27

## 2021-05-27 RX ADMIN — SCOPALAMINE 1 PATCH: 1 PATCH, EXTENDED RELEASE TRANSDERMAL at 07:01

## 2021-05-27 RX ADMIN — FENTANYL CITRATE 50 MCG: 50 INJECTION INTRAMUSCULAR; INTRAVENOUS at 07:33

## 2021-05-27 RX ADMIN — FENTANYL CITRATE 50 MCG: 50 INJECTION INTRAMUSCULAR; INTRAVENOUS at 07:52

## 2021-05-27 RX ADMIN — DEXAMETHASONE SODIUM PHOSPHATE 10 MG: 10 INJECTION, SOLUTION INTRAMUSCULAR; INTRAVENOUS at 07:36

## 2021-05-27 RX ADMIN — HEPARIN SODIUM 5000 UNITS: 5000 INJECTION INTRAVENOUS; SUBCUTANEOUS at 07:01

## 2021-05-27 RX ADMIN — KETOROLAC TROMETHAMINE 15 MG: 30 INJECTION, SOLUTION INTRAMUSCULAR at 09:04

## 2021-05-27 RX ADMIN — CEFAZOLIN SODIUM 1000 MG: 1 SOLUTION INTRAVENOUS at 07:40

## 2021-05-27 RX ADMIN — HYDROMORPHONE HYDROCHLORIDE 0.5 MG: 1 INJECTION, SOLUTION INTRAMUSCULAR; INTRAVENOUS; SUBCUTANEOUS at 09:27

## 2021-05-27 RX ADMIN — ACETAMINOPHEN 975 MG: 325 TABLET, FILM COATED ORAL at 07:01

## 2021-05-27 RX ADMIN — LIDOCAINE HYDROCHLORIDE 50 MG: 10 INJECTION, SOLUTION EPIDURAL; INFILTRATION; INTRACAUDAL; PERINEURAL at 07:36

## 2021-05-27 RX ADMIN — SUGAMMADEX 200 MG: 100 INJECTION, SOLUTION INTRAVENOUS at 09:13

## 2021-05-27 RX ADMIN — ACETAMINOPHEN 975 MG: 325 TABLET, FILM COATED ORAL at 11:03

## 2021-05-27 RX ADMIN — GABAPENTIN 100 MG: 100 CAPSULE ORAL at 07:02

## 2021-05-27 RX ADMIN — ROCURONIUM BROMIDE 50 MG: 50 INJECTION, SOLUTION INTRAVENOUS at 07:36

## 2021-05-27 RX ADMIN — MIDAZOLAM 2 MG: 1 INJECTION INTRAMUSCULAR; INTRAVENOUS at 07:29

## 2021-05-27 RX ADMIN — SODIUM CHLORIDE, SODIUM LACTATE, POTASSIUM CHLORIDE, AND CALCIUM CHLORIDE: .6; .31; .03; .02 INJECTION, SOLUTION INTRAVENOUS at 07:29

## 2021-05-27 RX ADMIN — PROPOFOL 150 MG: 10 INJECTION, EMULSION INTRAVENOUS at 07:36

## 2021-05-27 RX ADMIN — ROCURONIUM BROMIDE 10 MG: 50 INJECTION, SOLUTION INTRAVENOUS at 08:40

## 2021-05-27 RX ADMIN — ONDANSETRON 4 MG: 2 INJECTION INTRAMUSCULAR; INTRAVENOUS at 09:07

## 2021-05-27 RX ADMIN — PHENYLEPHRINE HYDROCHLORIDE 50 MCG: 10 INJECTION INTRAVENOUS at 07:50

## 2021-05-27 RX ADMIN — SODIUM CHLORIDE: 0.9 INJECTION, SOLUTION INTRAVENOUS at 07:40

## 2021-05-27 NOTE — DISCHARGE INSTRUCTIONS
[facility]  Homar Vargas, and Ana  [phone]    Hysterectomy Discharge Instructions    WHAT YOU NEED TO KNOW:   A hysterectomy is surgery to remove your uterus  Your ovaries, fallopian tubes, cervix, or part of your vagina may also need to be removed  The organs and tissue that will be removed depends on your medical condition  After a hysterectomy, you will not be able to become pregnant  If your ovaries are removed, you will go through menopause if you have not already  DISCHARGE INSTRUCTIONS:   Contact your doctor at the number above if:   · You have a fever over 101o  · You have nausea or are vomiting that does not improve after a light meal    · Your pain is getting worse, even after you take medicine  · You feel pain or burning when you urinate, or you have trouble urinating  · You have pus or a foul-smelling odor coming from your vagina  · Your wound is red, swollen, or draining pus  · You see new or an increased amount of bright red blood coming from your vagina or your incisions  · You have questions or concerns about your condition or care  Seek care immediately:   · Your arm or leg feels warm, tender, and painful  It may look swollen and red  · You have increasing abdominal or pelvic pain  · You have heavy vaginal bleeding that fills 1 or more sanitary pads in 1 hour  Call 911 for any of the following:   · You feel lightheaded, short of breath, and have chest pain  · You cough up blood  Medicines: You may need any of the following:  · Prescription medicine may be given  You may receive a prescription for pain medication or be advised to use over the counter (OTC) pain medication such as acetaminophen (Tylenol) or ibuprofen (Advil, Motrin)  Ask your healthcare provider how to take this medicine safely  · NSAIDs , such as ibuprofen, help decrease swelling, pain, and fever   NSAIDs can cause stomach bleeding or kidney problems in certain people  If you take blood thinner medicine, always ask your healthcare provider if NSAIDs are safe for you  Always read the medicine label and follow directions  · Stool softeners help treat or prevent constipation  · Take your medicine as directed  Contact your healthcare provider if you think your medicine is not helping or if you have side effects  Tell him or her if you are allergic to any medicine  Keep a list of the medicines, vitamins, and herbs you take  Include the amounts, and when and why you take them  Bring the list or the pill bottles to follow-up visits  Carry your medicine list with you in case of an emergency  Activity:   · Rest as needed  Get up and move around as directed to help prevent blood clots  Start with short walks and slowly increase the distance every day  Limit the number of times you climb stairs to 2 times each day for the first week  Plan most of your daily activities on one level of your home  · Do not lift objects heavier than 10 pounds for 6 weeks  Avoid strenuous activity for 2 weeks  · Do not strain during bowel movements  High-fiber foods and extra liquids can help you prevent constipation  Examples of high-fiber foods are fruit and bran  Prune juice and water are good liquids to drink  · Do not have sex, use tampons, or douche for up to 8 weeks  You may shower as soon as the day after surgery  Tub baths can be taken starting 2 weeks after surgery  Do not go into pools or hot tubs until cleared by your doctor  · Ask when it is safe for you to drive  It is generally safe to drive after 2 weeks and when no longer taking prescription pain medication  · Ask when you may return to work and to other regular activities  Wound care: Care for your abdominal incisions as directed  Carefully wash around the wound with soap and water   If you have Hibiclens or medicated soap that you were instructed to use before surgery, you may use that to wash with for up to 2 days after surgery  If not, any mild non-scented, non-abrasive soap is safe  It is okay to let the soap and water run over your incision  Do not scrub your incision  Dry the area and put on new, clean bandages as directed  Change your bandages when they get wet or dirty  If you have strips of medical tape, let them fall off on their own  It may take 7 to 14 days for them to fall off  Check your incision every day for redness, swelling, or pus  Deep breathing: Take deep breaths and cough 10 times each hour  This will decrease your risk for a lung infection  Take a deep breath and hold it for as long as you can  Let the air out and then cough strongly  Deep breaths help open your airway  You may be given an incentive spirometer to help you take deep breaths  Put the plastic piece in your mouth and take a slow, deep breath, then let the air out and cough  Repeat these steps 10 times every hour  Get support: This surgery may be life-changing for you and your family  You will no longer be able to get pregnant  Sudden changes in the levels of your hormones may occur and cause mood swings and depression  You may feel angry, sad, or frightened, or cry frequently and unexpectedly  These feelings are normal  Talk to your healthcare provider about where you can get support  You can also ask if hormone replacement medicine is right for you  Follow up with your healthcare provider or gynecologist as directed: You may need to return to have stitches removed, and for other tests  Write down your questions so you remember to ask them during your visits  © 2017 [employer] [employer] Information is for End User's use only and may not be sold, redistributed or otherwise used for commercial purposes  All illustrations and images included in CareNotes® are the copyrighted property of [employer]  or [employer]  The above information is an  only  It is not intended as medical advice for individual conditions or treatments  Talk to your doctor, nurse or pharmacist before following any medical regimen to see if it is safe and effective for you

## 2021-05-27 NOTE — INTERVAL H&P NOTE
H&P reviewed  After examining the patient I find no changes in the patients condition since the H&P had been written      Vitals:    05/27/21 0559   BP: 111/76   Pulse: 75   Resp: 18   Temp: 97 8 °F (36 6 °C)   SpO2: 100%

## 2021-05-27 NOTE — ANESTHESIA PREPROCEDURE EVALUATION
Procedure:  ROBOTIC HYSTERECTOMY, BILATERAL SALPINGECTOMY, LEFT OOPHORECTOMY, POSSIBLE RIGHT OOPHORECTOMY, POSSIBLE STAGING (N/A Abdomen)    Relevant Problems   ANESTHESIA   (+) Motion sickness      HEMATOLOGY   (+) Iron deficiency anemia        Physical Exam    Airway    Mallampati score: II  TM Distance: >3 FB  Neck ROM: full     Dental   No notable dental hx     Cardiovascular      Pulmonary      Other Findings        Anesthesia Plan  ASA Score- 2     Anesthesia Type- general with ASA Monitors  Additional Monitors:   Airway Plan: ETT  Plan Factors-Exercise tolerance (METS): >4 METS  Chart reviewed  Existing labs reviewed  Patient summary reviewed  Patient is not a current smoker  Induction- intravenous  Postoperative Plan- Plan for postoperative opioid use  Planned trial extubation    Informed Consent- Anesthetic plan and risks discussed with patient  I personally reviewed this patient with the CRNA  Discussed and agreed on the Anesthesia Plan with the CRNA  Kumar España

## 2021-05-27 NOTE — OP NOTE
OPERATIVE REPORT  PATIENT NAME: Cheyanne Adames    :  1970  MRN: [id number]  Pt Location: [facility] OR ROOM 14    SURGERY DATE: 2021    Surgeon(s) and Role:     * Cloyde Castleman, MD - Primary     * MILADYS Kenny-JAMI - Assisting     * Naga Chappell MD - Assisting    Preop Diagnosis:  Left ovarian cyst [N83 202]    Post-Op Diagnosis Codes:     * Left ovarian cyst [N83 202]    Procedure(s) (LRB):  ROBOTIC HYSTERECTOMY, BILATERAL SALPINGECTOMY, LEFT OOPHORECTOMY; ABLATION OF ENDOMETRIOSIS (N/A)    Specimen(s):  ID Type Source Tests Collected by Time Destination   1 : UTERUS, CERVIX, BILAT  TUBES, LEFT OVARY Tissue Uterus TISSUE EXAM Cloyde Castleman, MD 2021  8:37 AM        Estimated Blood Loss:   50 mL    Drains:  Urethral Catheter Non-latex;Straight-tip 16 Fr  (Active)   Number of days: 0       Anesthesia Type:   General    Operative Indications:  Left ovarian cyst [N83 202]  The patient is a 46y o  year-old with a history of pelvi pain found to have a left adnexal cyst   was normal   The results of her diagnostic testing, her differential diagnosis and treatment options, and the benefits and risks of these were reviewed  She opted to proceed with surgical management  Operative Findings:  Normal bowel, omentum, liver, gallbladder  Normal appearing upper abdomen  Normal appearing uterus, bilateral fallopian tubes and right ovary  Left ovary approximately 10cm with smooth walls  Anatomical variation with the ovarian vessels outside of the broad ligament with torsion noted through the window  Powder burn lesions were noted along bilateral utero-sacrals, posterior cul-de-sac and bladder  Frozen c/w benign findings  Complications:   None    Procedure and Technique:  The patient was brought to the Operating Room where general anesthesia was induced  The abdomen, vagina and perineum were prepped and draped   Atraumatic vaginal retractors were placed to identify the cervix and complete the examination  The cervix was grasped with a single tooth tenaculum  The cervix was easily dilated and the uterus sounded to 8 cm  A medium Koh ring was placed around the cervix  Then an 8 cm FLY cannula was placed in the uterine cavity without difficulty and the vaginal balloon inflated  A March catheter was placed in a sterile fashion  A point incision was made in the umbilicus, and with the abdomen under anterior traction with two artis-umbilical towel clamps, a Veress needle was inserted into the abdominal cavity with the carbon dioxide on low flow  Immediate pressure drop to 0 mm and diffuse abdominal distention indicated intraperitoneal placement  The peritoneal cavity was then insufflated with carbon dioxide on high flow to maintain a pressure of 15 mm Hg throughout the case  An incision was made 25 cm above the pubic symphysis, through which the robotic trocar was introduced into the abdominal cavity  The laparoscope was then inserted and the peritoneal cavity explored with the above findings  There was no evidence of visceral injury  Additional robotic ports and a 8mm assistant port were placed under direct visualization  With the patient in steep Trendelenburg, the robot was docked to the robotic ports and the instruments were placed under direct visualization  On placement of robotic instruments there was inadvertent rupture of the ovary for serous fluid  With the uterus on upward traction, the peritoneum was incised in planes lateral and parallel to the ovarian vessels on both sides  The ureters were then identified transperitoneally, coursing well posterior to the ovarian vessels  On the left, the ovarian vessels were coagulated proximally using the bipolar current then divided  On the right, the fallopian tube was divided through the mesosalpnix to the level of the cornua  The utero-ovarian ligament was cauterized and divided  The round ligaments were then isolated, cauterized and cut   The posterior peritoneum was dropped to the level of the koh ring  The vesico-uterine peritoneum was incised, and the bladder dissected from the cervix and upper vagina in a meticulous fashion to the level of the koh ring  The uterine vessels were then skeletonized bilaterally and coagulated at the level of the koh ring using bipolar current, then divided  With the bladder mobilized anteriorally and the rectum well away posteriorally, using the monopolar device, an incision was made in the anterior upper vagina at the level of the cervical-vaginal junction  The incision was continued circumferentially around the upper vagina, controlling upper vaginal blood supply laterally using the biopolar  This allowed completely amputation of the specimen  The vaginal balloon was removed  The uterus, cervix, bilateral fallopian tubes and and left ovary were then removed en bloc transvaginally  The upper vagina were then closed laparoscopically with 2-0 stratafix, taking care to avoid bladder injury  The upper vagina was intact and hemostatic  The vaginal balloon was removed  Endometriotic lesions were burned using mono-polar scissors without residual lesions on completion  The robotic instruments were removed, and the robot undocked  The laparoscopic skin incisions were irrigated and made hemostatic using electrocoagulation  They were closed with subcuticular stitches of 4-0 monocryl  All sponge, needle and instrument counts were correct x2  Anesthesia was reversed and the patient was taken to the PACU in a stable condition      I was present for the entire procedure  A physician assistant was required during the procedure for robotic instrumentation set up, retraction, tissue handling, assistance in dissection and suturing/closure    Patient Disposition:  PACU     SIGNATURE: Vito Lugo MD  DATE: May 27, 2021  TIME: 9:03 AM

## 2021-05-27 NOTE — ANESTHESIA POSTPROCEDURE EVALUATION
Post-Op Assessment Note    CV Status:  Stable  Pain Score: 5    Pain management: adequate     Mental Status:  Alert and awake   Hydration Status:  Euvolemic   PONV Controlled:  Controlled   Airway Patency:  Patent      Post Op Vitals Reviewed: Yes      Staff: Anesthesiologist, CRNA         No complications documented      /76 (05/27/21 0923)    Temp 97 9 °F (36 6 °C) (05/27/21 0923)    Pulse 73 (05/27/21 0923)   Resp   17   SpO2 100 % (05/27/21 0923)

## 2021-06-01 ENCOUNTER — TELEPHONE (OUTPATIENT)
Dept: HEMATOLOGY ONCOLOGY | Facility: CLINIC | Age: 51
End: 2021-06-01

## 2021-06-01 NOTE — TELEPHONE ENCOUNTER
Patient had surgery on 5/27/2021  Calling to review discharge instructions that read she should not drive for 2 weeks  Please contact patient, [phone]

## 2021-06-10 NOTE — PROGRESS NOTES
Assessment/Plan:    Problem List Items Addressed This Visit        Other    History of robot-assisted laparoscopic hysterectomy - Primary     50yo with adnexal mass s/p Ra-tlh/bso presents for post op visit  Pathology:  Left ovary:   * collapsed, unicameral serous cystadenoma, 11 cm diameter    D/w pt the benign pathology  She has been recovering well with some spotting  Silver nitrate applied to raw edge of vaginal cuff  Continue with pelvic rest for 8 weeks  She can continue with routine gyn care  CHIEF COMPLAINT: post op        Problem:  Cancer Staging  No matching staging information was found for the patient  Previous therapy:  Oncology History    No history exists  Patient ID: Heri Saleh is a 46 y o  female  54yo with adnexal mass s/p Ra-tlh/bso presents for post op visit  Recovering well post operative  Denies vaginal discharge but does not spotting daily that is minimal  Normal BM/urination  No pelvic pain/abdominal pain  Appetite adequate  The following portions of the patient's history were reviewed and updated as appropriate: allergies, current medications, past family history, past medical history, past social history, past surgical history and problem list     Review of Systems   Constitutional: Negative for appetite change, chills, fatigue and fever  Respiratory: Negative for chest tightness and shortness of breath  Gastrointestinal: Negative for abdominal distention, abdominal pain, constipation, diarrhea and nausea  Genitourinary: Positive for vaginal bleeding  Negative for difficulty urinating, flank pain, frequency, urgency, vaginal discharge and vaginal pain  Musculoskeletal: Negative for back pain, joint swelling and myalgias  Skin: Negative for rash  Neurological: Negative for dizziness, light-headedness, numbness and headaches           Current Outpatient Medications:     Cholecalciferol (VITAMIN D3 PO), Take 2,000 Units by mouth daily , Disp: , Rfl:     loratadine (CLARITIN) 10 mg tablet, Take 10 mg by mouth daily, Disp: , Rfl:     Objective:    Last menstrual period 05/16/2021, not currently breastfeeding  There is no height or weight on file to calculate BMI  There is no height or weight on file to calculate BSA  Physical Exam  HENT:      Head: Normocephalic and atraumatic  Neck:      Musculoskeletal: Normal range of motion  Cardiovascular:      Rate and Rhythm: Normal rate and regular rhythm  Pulmonary:      Effort: Pulmonary effort is normal    Abdominal:      Palpations: Abdomen is soft  Genitourinary:     Comments: The external female genitalia is normal  The bartholin's, uretheral and skenes glands are normal  The urethral meatus is normal (midline with no lesions)  Anus without fissure or lesion  Speculum exam reveals a grossly normal vagina cuff  No masses, lesions,discharge  Mild bleeding noted at posterior middle edge and silver nitrate applied  No significant cystocele or rectocele noted  Bimanual exam notes a surgical absent cervix, uterus and adnexal structures  No masses or fullness  Bladder is without fullness, mass or tenderness  Musculoskeletal: Normal range of motion  Skin:     General: Skin is warm and dry  Neurological:      Mental Status: She is alert and oriented to person, place, and time

## 2021-06-10 NOTE — ASSESSMENT & PLAN NOTE
54yo with adnexal mass s/p Ra-tlh/bso presents for post op visit  Pathology:  Left ovary:   * collapsed, unicameral serous cystadenoma, 11 cm diameter    D/w pt the benign pathology  She has been recovering well with some spotting  Silver nitrate applied to raw edge of vaginal cuff  Continue with pelvic rest for 8 weeks  She can continue with routine gyn care

## 2021-06-11 ENCOUNTER — OFFICE VISIT (OUTPATIENT)
Dept: GYNECOLOGIC ONCOLOGY | Facility: CLINIC | Age: 51
End: 2021-06-11

## 2021-06-11 VITALS
DIASTOLIC BLOOD PRESSURE: 84 MMHG | HEART RATE: 68 BPM | WEIGHT: 162 LBS | BODY MASS INDEX: 26.03 KG/M2 | SYSTOLIC BLOOD PRESSURE: 122 MMHG | HEIGHT: 66 IN | RESPIRATION RATE: 18 BRPM

## 2021-06-11 DIAGNOSIS — Z90.710 HISTORY OF ROBOT-ASSISTED LAPAROSCOPIC HYSTERECTOMY: Primary | ICD-10-CM

## 2021-06-11 PROCEDURE — 99024 POSTOP FOLLOW-UP VISIT: CPT | Performed by: OBSTETRICS & GYNECOLOGY

## 2021-06-11 PROCEDURE — 3008F BODY MASS INDEX DOCD: CPT | Performed by: OBSTETRICS & GYNECOLOGY

## 2021-06-24 ENCOUNTER — TELEPHONE (OUTPATIENT)
Dept: HEMATOLOGY ONCOLOGY | Facility: CLINIC | Age: 51
End: 2021-06-24

## 2021-06-24 NOTE — TELEPHONE ENCOUNTER
Amado Carmona called back to see if maybe she could be called back sometime today regarding the rash issue that she is experiencing  She did leave a message on JUAN CARLOS Carter's phone and stated that she is really uncomfortable and would like some guidance on this matter and would rather not like to wait until tomorrow  I informed Amado Carmona that I would reach out Rajni Cavazos for her but that she is seeing patients  She expressed understanding

## 2021-06-25 NOTE — TELEPHONE ENCOUNTER
Return call placed to patient  Apologized for issues with call intake and not returning her call yesterday  Patient will try topical hydrocortisone cream this AM, and report later this afternoon  Can consider medrol dosepak if no improvement  I asked the patient to request to get me on the line immediately at time of callback

## 2021-07-03 ENCOUNTER — OFFICE VISIT (OUTPATIENT)
Dept: URGENT CARE | Facility: CLINIC | Age: 51
End: 2021-07-03
Payer: COMMERCIAL

## 2021-07-03 VITALS
HEART RATE: 76 BPM | WEIGHT: 162 LBS | HEIGHT: 66 IN | DIASTOLIC BLOOD PRESSURE: 88 MMHG | TEMPERATURE: 97.9 F | BODY MASS INDEX: 26.03 KG/M2 | OXYGEN SATURATION: 99 % | SYSTOLIC BLOOD PRESSURE: 125 MMHG | RESPIRATION RATE: 18 BRPM

## 2021-07-03 DIAGNOSIS — R21 RASH: ICD-10-CM

## 2021-07-03 DIAGNOSIS — B37.2 SKIN YEAST INFECTION: Primary | ICD-10-CM

## 2021-07-03 PROCEDURE — S9083 URGENT CARE CENTER GLOBAL: HCPCS | Performed by: NURSE PRACTITIONER

## 2021-07-03 PROCEDURE — G0382 LEV 3 HOSP TYPE B ED VISIT: HCPCS | Performed by: NURSE PRACTITIONER

## 2021-07-03 RX ORDER — FLUCONAZOLE 200 MG/1
200 TABLET ORAL DAILY
Qty: 7 TABLET | Refills: 0 | Status: SHIPPED | OUTPATIENT
Start: 2021-07-03 | End: 2021-07-10

## 2021-07-03 RX ORDER — PREDNISONE 10 MG/1
10 TABLET ORAL DAILY
Qty: 21 TABLET | Refills: 0 | Status: SHIPPED | OUTPATIENT
Start: 2021-07-03

## 2021-07-03 NOTE — PROGRESS NOTES
[id number]        NAME: Sergo Singleton is a 46 y o  female  : 1970    MRN: [id number]  DATE: July 3, 2021  TIME: 1:50 PM    Assessment and Plan   Skin yeast infection [B37 2]  1  Skin yeast infection  fluconazole (DIFLUCAN) 200 mg tablet   2  Rash  predniSONE 10 mg tablet         Patient Instructions       Follow up with PCP in 3-5 days  Proceed to  ER if symptoms worsen  Chief Complaint     Chief Complaint   Patient presents with    Rash     Pt states rash x2 weeks  on her abd  it is now spreading  and red and itching  Pt had surgery 5 weeks ago  Pt used  Benadryl cream and  Cortisone cream   Pt just f finished a medrol dose jeremy  last pill was a few days ago  History of Present Illness       Patient is a 46year old female presenting with abdominal rash for the past 2 weeks  She had laparoscopic abdominal surgery 4-5 weeks ago  She completed a Medrol dose past without improvement  Skin is red and itchy  Denies drainage, fever, or chills  Denies known contact irritants  Denies changes to hygiene products  She is applying hydrocortisone cream without improvement  Review of Systems   Review of Systems   Constitutional: Negative for activity change, chills and fever  Respiratory: Negative for cough  Skin: Positive for color change and rash           Current Medications       Current Outpatient Medications:     Cholecalciferol (VITAMIN D3 PO), Take 2,000 Units by mouth daily , Disp: , Rfl:     loratadine (CLARITIN) 10 mg tablet, Take 10 mg by mouth daily, Disp: , Rfl:     fluconazole (DIFLUCAN) 200 mg tablet, Take 1 tablet (200 mg total) by mouth daily for 7 days, Disp: 7 tablet, Rfl: 0    methylPREDNISolone 4 MG tablet therapy pack, Use as directed on package, Disp: 1 each, Rfl: 0    predniSONE 10 mg tablet, Take 1 tablet (10 mg total) by mouth daily 60mg days 1, 50mg day 2, 40mg day 3, 30mg day 4, 20 mg day 5, 10mg day 6 , Disp: 21 tablet, Rfl: 0    Current Allergies Allergies as of 07/03/2021 - Reviewed 07/03/2021   Allergen Reaction Noted    Codeine GI Intolerance 02/11/2008            The following portions of the patient's history were reviewed and updated as appropriate: allergies, current medications, past family history, past medical history, past social history, past surgical history and problem list      Past Medical History:   Diagnosis Date    Abnormal Pap smear of cervix     Acute sinusitis     Allergic rhinitis     Onset: [id number]    Anemia     Dysplasia of cervix     MILD  LAST ASSESSED 8/18/2014    Head ache     HPV in female     LGSIL of cervix of undetermined significance     Low grade squamous intraepithelial lesion (LGSIL) on Papanicolaou smear of cervix     Resolved: 2014    Mass of chin     Mild dysplasia of cervix (ELIANA I)     Last Assessed: [id number]    Mononucleosis     Resolved: 1992    Normal delivery     2003 son, 2005 daughter    Pyelonephritis     UTI (urinary tract infection)        Past Surgical History:   Procedure Laterality Date    BREAST BIOPSY Left 2017    CERVICAL BIOPSY  W/ LOOP ELECTRODE EXCISION      COLPOSCOPY W/ BIOPSY / CURETTAGE  2014, 2018    LSIL    MAMMO STEREOTACTIC BREAST BIOPSY LEFT (ALL INC) Left 7/10/2017    DC EXC SKIN BENIG 1 1-2 CM REMAINDR BODY Right 12/6/2017    Procedure: EXCISION NODULE CERVICAL REGION;  Surgeon: Ronen Lopez MD;  Location: [facility];  Service: General    DC LAPAROSCOPY W TOT HYSTERECTUTERUS <=250 GRAM  W TUBE/OVARY N/A 5/27/2021    Procedure: ROBOTIC HYSTERECTOMY, BILATERAL SALPINGECTOMY, LEFT OOPHORECTOMY; ABLATION OF ENDOMETRIOSIS;  Surgeon: Jim Coy MD;  Location[facility];  Service: Gynecology Oncology       Family History   Problem Relation Age of Onset    No Known Problems Mother     Scleroderma Father     Prostate cancer Maternal Grandfather     ALS Paternal Grandmother     Parkinsonism Paternal Grandfather     Arthritis Family     Cystic fibrosis Family patient not a carrier    Other Family         Headache Syndromes    Down syndrome Family         nephew    Osteoporosis Family     No Known Problems Daughter     Cancer Paternal Aunt          Medications have been verified  Objective   /88 (BP Location: Right arm, Patient Position: Sitting, Cuff Size: Standard)   Pulse 76   Temp 97 9 °F (36 6 °C) (Tympanic)   Resp 18   Ht 5' 6" (1 676 m)   Wt 73 5 kg (162 lb)   SpO2 99%   BMI 26 15 kg/m²        Physical Exam     Physical Exam  Vitals reviewed  Constitutional:       General: She is awake  She is not in acute distress  Appearance: Normal appearance  She is normal weight  Cardiovascular:      Rate and Rhythm: Normal rate  Pulmonary:      Effort: Pulmonary effort is normal    Skin:     General: Skin is warm and moist       Comments: Anterior aspect of abdomen with raised, erythematous rash  Few scattered papules  1 single fluid filled vesicle on right side  Neurological:      General: No focal deficit present  Mental Status: She is alert, oriented to person, place, and time and easily aroused  Psychiatric:         Behavior: Behavior is cooperative

## 2021-12-09 ENCOUNTER — ESTABLISHED COMPREHENSIVE EXAM (OUTPATIENT)
Dept: URBAN - METROPOLITAN AREA CLINIC 6 | Facility: CLINIC | Age: 51
End: 2021-12-09

## 2021-12-09 DIAGNOSIS — Z01.00: ICD-10-CM

## 2021-12-09 DIAGNOSIS — H52.13: ICD-10-CM

## 2021-12-09 PROCEDURE — G8427 DOCREV CUR MEDS BY ELIG CLIN: HCPCS

## 2021-12-09 PROCEDURE — 92310 CONTACT LENS FITTING OU: CPT

## 2021-12-09 PROCEDURE — 92015 DETERMINE REFRACTIVE STATE: CPT

## 2021-12-09 PROCEDURE — 92014 COMPRE OPH EXAM EST PT 1/>: CPT

## 2021-12-09 ASSESSMENT — KERATOMETRY
OS_K1POWER_DIOPTERS: 44.25
OD_AXISANGLE_DEGREES: 075
OS_AXISANGLE_DEGREES: 180
OS_K2POWER_DIOPTERS: 45.50
OD_AXISANGLE2_DEGREES: 165
OD_K2POWER_DIOPTERS: 44.50
OD_K1POWER_DIOPTERS: 43.75
OS_AXISANGLE2_DEGREES: 90

## 2021-12-09 ASSESSMENT — TONOMETRY
OD_IOP_MMHG: 14
OS_IOP_MMHG: 14

## 2021-12-09 ASSESSMENT — VISUAL ACUITY
OS_CC: 20/30+2
OD_CC: 20/25

## 2022-06-17 ENCOUNTER — TELEPHONE (OUTPATIENT)
Dept: OBGYN CLINIC | Facility: CLINIC | Age: 52
End: 2022-06-17

## 2022-06-17 DIAGNOSIS — Z12.31 ENCOUNTER FOR SCREENING MAMMOGRAM FOR MALIGNANT NEOPLASM OF BREAST: Primary | ICD-10-CM

## 2022-06-17 NOTE — TELEPHONE ENCOUNTER
Pt has yearly carrillo for Oct but is requesting mammo script prior  Please put in [facility] and/or call pt to discuss

## 2022-06-24 ENCOUNTER — RA CDI HCC (OUTPATIENT)
Dept: OTHER | Facility: HOSPITAL | Age: 52
End: 2022-06-24

## 2022-06-24 NOTE — PROGRESS NOTES
[facility]  coding opportunities       Chart reviewed, no opportunity found: CHART REVIEWED, NO OPPORTUNITY FOUND     Patients Insurance     Commercial Insurance: [address] [place] [address]

## 2022-07-01 ENCOUNTER — OFFICE VISIT (OUTPATIENT)
Dept: FAMILY MEDICINE CLINIC | Facility: CLINIC | Age: 52
End: 2022-07-01
Payer: COMMERCIAL

## 2022-07-01 VITALS
SYSTOLIC BLOOD PRESSURE: 100 MMHG | BODY MASS INDEX: 26.68 KG/M2 | WEIGHT: 166 LBS | HEART RATE: 70 BPM | DIASTOLIC BLOOD PRESSURE: 60 MMHG | HEIGHT: 66 IN | OXYGEN SATURATION: 99 %

## 2022-07-01 DIAGNOSIS — Z13.220 SCREENING, LIPID: ICD-10-CM

## 2022-07-01 DIAGNOSIS — D17.1 LIPOMA OF TORSO: Primary | ICD-10-CM

## 2022-07-01 DIAGNOSIS — Z13.29 SCREENING FOR THYROID DISORDER: ICD-10-CM

## 2022-07-01 DIAGNOSIS — Z13.1 SCREENING FOR DIABETES MELLITUS: ICD-10-CM

## 2022-07-01 PROCEDURE — 99213 OFFICE O/P EST LOW 20 MIN: CPT | Performed by: FAMILY MEDICINE

## 2022-07-01 PROCEDURE — 3725F SCREEN DEPRESSION PERFORMED: CPT | Performed by: FAMILY MEDICINE

## 2022-07-01 NOTE — ASSESSMENT & PLAN NOTE
Small Lipoma, Rt side of upper abdomen  Patient will monitor for worsening symptoms  Follow up if symptoms worsen

## 2022-07-01 NOTE — PROGRESS NOTES
Subjective:      Patient ID: Mikayla Sams is a 46 y o  female  HPI      Patient is here reporting a small lump on the right side of the abdomen  It has been present for a long time  PE recently patient noted slight hardening of the area  Has not experienced any redness or pain in the area      Past Medical History:   Diagnosis Date    Abnormal Pap smear of cervix     Acute sinusitis     Allergic rhinitis     Onset: [id number]    Anemia     Dysplasia of cervix     MILD  LAST ASSESSED 8/18/2014    Head ache     HPV in female     LGSIL of cervix of undetermined significance     Low grade squamous intraepithelial lesion (LGSIL) on Papanicolaou smear of cervix     Resolved: 2014    Mass of chin     Mild dysplasia of cervix (ELIANA I)     Last Assessed: [id number]    Mononucleosis     Resolved: 1992    Normal delivery     2003 son, 2005 daughter    Pyelonephritis     UTI (urinary tract infection)        Family History   Problem Relation Age of Onset    No Known Problems Mother     Scleroderma Father     Prostate cancer Maternal Grandfather     ALS Paternal Grandmother     Parkinsonism Paternal Grandfather     Arthritis Family     Cystic fibrosis Family         patient not a carrier    Other Family         Headache Syndromes    Down syndrome Family         nephew    Osteoporosis Family     No Known Problems Daughter     Cancer Paternal Aunt        Past Surgical History:   Procedure Laterality Date    BREAST BIOPSY Left 2017    CERVICAL BIOPSY  W/ LOOP ELECTRODE EXCISION      COLPOSCOPY W/ BIOPSY / CURETTAGE  2014, 2018    LSIL    MAMMO STEREOTACTIC BREAST BIOPSY LEFT (ALL INC) Left 7/10/2017    LA EXC SKIN BENIG 1 1-2 CM REMAINDR BODY Right 12/6/2017    Procedure: EXCISION NODULE CERVICAL REGION;  Surgeon: Pauline Lee MD;  [facility];  Service: General    LA LAPAROSCOPY W TOT HYSTERECTUTERUS <=250 GRAM  W TUBE/OVARY N/A 5/27/2021    Procedure: ROBOTIC HYSTERECTOMY, BILATERAL SALPINGECTOMY, LEFT OOPHORECTOMY; ABLATION OF ENDOMETRIOSIS;  Surgeon: Shawna Florse MD;  Location[facility];  Service: Gynecology Oncology        reports that she has never smoked  She has never used smokeless tobacco  She reports current alcohol use of about 2 0 standard drinks of alcohol per week  She reports that she does not use drugs  Current Outpatient Medications:     Cholecalciferol (VITAMIN D3 PO), Take 2,000 Units by mouth daily , Disp: , Rfl:     loratadine (CLARITIN) 10 mg tablet, Take 10 mg by mouth daily, Disp: , Rfl:     methylPREDNISolone 4 MG tablet therapy pack, Use as directed on package, Disp: 1 each, Rfl: 0    predniSONE 10 mg tablet, Take 1 tablet (10 mg total) by mouth daily 60mg days 1, 50mg day 2, 40mg day 3, 30mg day 4, 20 mg day 5, 10mg day 6 , Disp: 21 tablet, Rfl: 0    The following portions of the patient's history were reviewed and updated as appropriate: allergies, current medications, past family history, past medical history, past social history, past surgical history and problem list     Review of Systems   Constitutional: Negative  Respiratory: Negative  Negative for shortness of breath  Cardiovascular: Negative  Negative for chest pain and palpitations  Musculoskeletal: Negative for myalgias  Neurological: Negative  Psychiatric/Behavioral: Negative  Objective:    /60   Pulse 70   Ht 5' 6" (1 676 m)   Wt 75 3 kg (166 lb)   SpO2 99%   BMI 26 79 kg/m²      Physical Exam  Constitutional:       Appearance: She is well-developed  Cardiovascular:      Rate and Rhythm: Normal rate and regular rhythm  Heart sounds: Normal heart sounds  Pulmonary:      Effort: Pulmonary effort is normal       Breath sounds: Normal breath sounds  Abdominal:      General: Bowel sounds are normal       Palpations: Abdomen is soft  Skin:     Comments: 0 5 cm lipoma on the right side of the upper abdomen  Soft, freely mobile     Neurological: Mental Status: She is alert and oriented to person, place, and time  Psychiatric:         Behavior: Behavior normal          Judgment: Judgment normal            No results found for this or any previous visit (from the past 1008 hour(s))  Assessment/Plan:    No problem-specific Assessment & Plan notes found for this encounter  Problem List Items Addressed This Visit        Other    Screening, lipid    Relevant Orders    Lipid panel    Lipoma of torso - Primary     Small Lipoma, Rt side of upper abdomen  Patient will monitor for worsening symptoms  Follow up if symptoms worsen               Other Visit Diagnoses     Screening for thyroid disorder        Relevant Orders    TSH, 3rd generation with Free T4 reflex    Screening for diabetes mellitus        Relevant Orders    Comprehensive metabolic panel

## 2022-07-22 ENCOUNTER — HOSPITAL ENCOUNTER (OUTPATIENT)
Dept: RADIOLOGY | Age: 52
Discharge: HOME | End: 2022-07-22
Payer: COMMERCIAL

## 2022-07-22 VITALS — WEIGHT: 166 LBS | BODY MASS INDEX: 26.68 KG/M2 | HEIGHT: 66 IN

## 2022-07-22 DIAGNOSIS — Z12.31 ENCOUNTER FOR SCREENING MAMMOGRAM FOR MALIGNANT NEOPLASM OF BREAST: ICD-10-CM

## 2022-07-22 PROCEDURE — 77063 BREAST TOMOSYNTHESIS BI: CPT

## 2022-07-22 PROCEDURE — 77067 SCR MAMMO BI INCL CAD: CPT

## 2022-09-15 ENCOUNTER — OFFICE VISIT (OUTPATIENT)
Dept: FAMILY MEDICINE CLINIC | Facility: CLINIC | Age: 52
End: 2022-09-15
Payer: COMMERCIAL

## 2022-09-15 VITALS — RESPIRATION RATE: 16 BRPM | OXYGEN SATURATION: 97 % | HEART RATE: 82 BPM | TEMPERATURE: 99.1 F

## 2022-09-15 DIAGNOSIS — J01.10 ACUTE NON-RECURRENT FRONTAL SINUSITIS: Primary | ICD-10-CM

## 2022-09-15 PROCEDURE — 99214 OFFICE O/P EST MOD 30 MIN: CPT | Performed by: FAMILY MEDICINE

## 2022-09-15 RX ORDER — AMOXICILLIN 875 MG/1
875 TABLET, COATED ORAL 2 TIMES DAILY
Qty: 14 TABLET | Refills: 0 | Status: SHIPPED | OUTPATIENT
Start: 2022-09-15 | End: 2022-09-22

## 2022-09-15 RX ORDER — FLUTICASONE PROPIONATE 50 MCG
1 SPRAY, SUSPENSION (ML) NASAL DAILY
Qty: 18 G | Refills: 0 | Status: SHIPPED | OUTPATIENT
Start: 2022-09-15

## 2022-09-15 NOTE — PROGRESS NOTES
Subjective:      Patient ID: Ginger Mcdonald is a 46 y o  female  Sinusitis  This is a new problem  The current episode started in the past 7 days  The problem is unchanged  There has been no fever  The pain is moderate  Associated symptoms include congestion, coughing, headaches and sinus pressure  Pertinent negatives include no shortness of breath  Past treatments include acetaminophen and oral decongestants  The treatment provided moderate relief         Past Medical History:   Diagnosis Date    Abnormal Pap smear of cervix     Acute sinusitis     Allergic rhinitis     Onset: [id number]    Anemia     Dysplasia of cervix     MILD  LAST ASSESSED 8/18/2014    Head ache     HPV in female     LGSIL of cervix of undetermined significance     Low grade squamous intraepithelial lesion (LGSIL) on Papanicolaou smear of cervix     Resolved: 2014    Mass of chin     Mild dysplasia of cervix (ELIANA I)     Last Assessed: [id number]    Mononucleosis     Resolved: 1992    Normal delivery     2003 son, 2005 daughter    Pyelonephritis     UTI (urinary tract infection)        Family History   Problem Relation Age of Onset    No Known Problems Mother     Scleroderma Father     Prostate cancer Maternal Grandfather     ALS Paternal Grandmother     Parkinsonism Paternal Grandfather     Arthritis Family     Cystic fibrosis Family         patient not a carrier    Other Family         Headache Syndromes    Down syndrome Family         nephew    Osteoporosis Family     No Known Problems Daughter     Cancer Paternal Aunt        Past Surgical History:   Procedure Laterality Date    BREAST BIOPSY Left 2017    CERVICAL BIOPSY  W/ LOOP ELECTRODE EXCISION      COLPOSCOPY W/ BIOPSY / CURETTAGE  2014, 2018    LSIL    HYSTERECTOMY      MAMMO STEREOTACTIC BREAST BIOPSY LEFT (ALL INC) Left 07/10/2017    OH EXC SKIN BENIG 1 1-2 CM REMAINDR BODY Right 12/06/2017    Procedure: EXCISION NODULE CERVICAL REGION;  Surgeon: Qiana Malcolm MD;  [facility];  Service: General    SC LAPAROSCOPY W TOT HYSTERECTUTERUS <=250 GRAM  W TUBE/OVARY N/A 05/27/2021    Procedure: ROBOTIC HYSTERECTOMY, BILATERAL SALPINGECTOMY, LEFT OOPHORECTOMY; ABLATION OF ENDOMETRIOSIS;  Surgeon: Imer Rudolph MD;  [facility];  Service: Gynecology Oncology        reports that she has never smoked  She has never used smokeless tobacco  She reports current alcohol use of about 2 0 standard drinks of alcohol per week  She reports that she does not use drugs  Current Outpatient Medications:     amoxicillin (AMOXIL) 875 mg tablet, Take 1 tablet (875 mg total) by mouth 2 (two) times a day for 7 days, Disp: 14 tablet, Rfl: 0    Cholecalciferol (VITAMIN D3 PO), Take 2,000 Units by mouth daily , Disp: , Rfl:     fluticasone (FLONASE) 50 mcg/act nasal spray, 1 spray into each nostril daily, Disp: 18 g, Rfl: 0    loratadine (CLARITIN) 10 mg tablet, Take 10 mg by mouth daily, Disp: , Rfl:     methylPREDNISolone 4 MG tablet therapy pack, Use as directed on package, Disp: 1 each, Rfl: 0    predniSONE 10 mg tablet, Take 1 tablet (10 mg total) by mouth daily 60mg days 1, 50mg day 2, 40mg day 3, 30mg day 4, 20 mg day 5, 10mg day 6 , Disp: 21 tablet, Rfl: 0    The following portions of the patient's history were reviewed and updated as appropriate: allergies, current medications, past family history, past medical history, past social history, past surgical history and problem list     Review of Systems   Constitutional: Negative  HENT: Positive for congestion and sinus pressure  Respiratory: Positive for cough  Negative for shortness of breath  Cardiovascular: Negative  Negative for chest pain and palpitations  Musculoskeletal: Negative for myalgias  Neurological: Positive for headaches  Psychiatric/Behavioral: Negative              Objective:    Pulse 82   Temp 99 1 °F (37 3 °C) (Tympanic)   Resp 16   LMP 05/16/2021 (Exact Date) SpO2 97%      Physical Exam  Constitutional:       Appearance: She is well-developed  Cardiovascular:      Rate and Rhythm: Normal rate and regular rhythm  Heart sounds: Normal heart sounds  Pulmonary:      Effort: Pulmonary effort is normal       Breath sounds: Normal breath sounds  Abdominal:      General: Bowel sounds are normal       Palpations: Abdomen is soft  Neurological:      Mental Status: She is alert and oriented to person, place, and time  Psychiatric:         Behavior: Behavior normal          Judgment: Judgment normal            No results found for this or any previous visit (from the past 1008 hour(s))  Assessment/Plan:    No problem-specific Assessment & Plan notes found for this encounter             Problem List Items Addressed This Visit    None     Visit Diagnoses     Acute non-recurrent frontal sinusitis    -  Primary    Relevant Medications    amoxicillin (AMOXIL) 875 mg tablet    fluticasone (FLONASE) 50 mcg/act nasal spray

## 2022-10-12 PROBLEM — Z13.220 SCREENING, LIPID: Status: RESOLVED | Noted: 2021-01-13 | Resolved: 2022-10-12

## 2022-10-26 ENCOUNTER — ANNUAL EXAM (OUTPATIENT)
Dept: OBGYN CLINIC | Facility: CLINIC | Age: 52
End: 2022-10-26

## 2022-10-26 VITALS
WEIGHT: 167.4 LBS | DIASTOLIC BLOOD PRESSURE: 74 MMHG | HEIGHT: 66 IN | SYSTOLIC BLOOD PRESSURE: 110 MMHG | BODY MASS INDEX: 26.9 KG/M2

## 2022-10-26 DIAGNOSIS — Z12.31 ENCOUNTER FOR SCREENING MAMMOGRAM FOR MALIGNANT NEOPLASM OF BREAST: ICD-10-CM

## 2022-10-26 DIAGNOSIS — Z12.11 COLON CANCER SCREENING: ICD-10-CM

## 2022-10-26 DIAGNOSIS — Z01.419 ENCNTR FOR GYN EXAM (GENERAL) (ROUTINE) W/O ABN FINDINGS: Primary | ICD-10-CM

## 2023-09-15 ENCOUNTER — TELEPHONE (OUTPATIENT)
Dept: OBGYN CLINIC | Facility: CLINIC | Age: 53
End: 2023-09-15

## 2023-10-02 ENCOUNTER — HOSPITAL ENCOUNTER (OUTPATIENT)
Dept: RADIOLOGY | Facility: IMAGING CENTER | Age: 53
Discharge: HOME | End: 2023-10-02
Payer: COMMERCIAL

## 2023-10-02 VITALS — HEIGHT: 66 IN | WEIGHT: 134.92 LBS | BODY MASS INDEX: 21.68 KG/M2

## 2023-10-02 DIAGNOSIS — Z12.31 ENCOUNTER FOR SCREENING MAMMOGRAM FOR MALIGNANT NEOPLASM OF BREAST: ICD-10-CM

## 2023-10-02 PROCEDURE — 77067 SCR MAMMO BI INCL CAD: CPT

## 2023-10-02 PROCEDURE — 77063 BREAST TOMOSYNTHESIS BI: CPT

## 2023-11-15 ENCOUNTER — ANNUAL EXAM (OUTPATIENT)
Age: 53
End: 2023-11-15
Payer: COMMERCIAL

## 2023-11-15 VITALS
SYSTOLIC BLOOD PRESSURE: 110 MMHG | BODY MASS INDEX: 22.03 KG/M2 | HEIGHT: 67 IN | DIASTOLIC BLOOD PRESSURE: 70 MMHG | WEIGHT: 140.4 LBS

## 2023-11-15 DIAGNOSIS — Z87.410 HISTORY OF CERVICAL DYSPLASIA: ICD-10-CM

## 2023-11-15 DIAGNOSIS — Z01.419 ENCNTR FOR GYN EXAM (GENERAL) (ROUTINE) W/O ABN FINDINGS: Primary | ICD-10-CM

## 2023-11-15 PROCEDURE — G0145 SCR C/V CYTO,THINLAYER,RESCR: HCPCS | Performed by: OBSTETRICS & GYNECOLOGY

## 2023-11-15 PROCEDURE — S0612 ANNUAL GYNECOLOGICAL EXAMINA: HCPCS | Performed by: OBSTETRICS & GYNECOLOGY

## 2023-11-15 PROCEDURE — G0476 HPV COMBO ASSAY CA SCREEN: HCPCS | Performed by: OBSTETRICS & GYNECOLOGY

## 2023-11-15 NOTE — PROGRESS NOTES
Jose Shaver   1970    CC:  Yearly exam    S:  48 y.o. female here for yearly exam. She is s/p LINK, BS, L oophorectomy, endometriois ablation for benign path (May 2021). She denies vaginal bleeding. History of ELIANA II 3/2018. Will need continued surveillance. She denies vaginal discharge, itching, odor or dryness. She has no urinary, bowel, breast concerns. She is sexually active without pain, bleeding. Some dryness - has not yet started using lubricants, discussed coconut oil, uberlube or good clean love.      Last Pap 9/14/20 - NILM, Neg HPV  Last Mammo 10/2/23 - Benign  Last Colonoscopy - Cologard 2/22/21 Negative     Family hx of breast cancer: no  Family hx of ovarian cancer: no  Family hx of colon cancer: no      Current Outpatient Medications:     Cholecalciferol (VITAMIN D3 PO), Take 2,000 Units by mouth daily , Disp: , Rfl:     fluticasone (FLONASE) 50 mcg/act nasal spray, 1 spray into each nostril daily, Disp: 18 g, Rfl: 0    loratadine (CLARITIN) 10 mg tablet, Take 10 mg by mouth daily, Disp: , Rfl:   Patient Active Problem List   Diagnosis    Hematuria    Mammogram abnormal    Pleomorphic adenoma of submandibular gland    Submandibular gland hypertrophy    Increased frequency of urination    Vitamin D deficiency    Moderate dysplasia of cervix (ELIANA II)    Neck pain    Muscle spasm    Contact dermatitis due to poison ivy    Iron deficiency anemia    Menstrual irregularity    Plantar fasciitis, bilateral    Left ovarian cyst    Motion sickness    History of robot-assisted laparoscopic hysterectomy    Lipoma of torso     Past Medical History:   Diagnosis Date    Abnormal Pap smear of cervix     Acute sinusitis     Allergic rhinitis     Onset: [id number]    Anemia     Dysplasia of cervix     MILD  LAST ASSESSED 8/18/2014    Head ache     HPV in female     LGSIL of cervix of undetermined significance     Low grade squamous intraepithelial lesion (LGSIL) on Papanicolaou smear of cervix     Resolved: 2014    Mass of chin     Mild dysplasia of cervix (ELIANA I)     Last Assessed: [id number]    Mononucleosis     Resolved: 1992    Normal delivery     2003 son, 2005 daughter    Pyelonephritis     UTI (urinary tract infection)      Past Surgical History:   Procedure Laterality Date    BREAST BIOPSY Left 2017    CERVICAL BIOPSY  W/ LOOP ELECTRODE EXCISION      COLPOSCOPY W/ BIOPSY / CURETTAGE  2014, 2018    LSIL    HYSTERECTOMY      MAMMO STEREOTACTIC BREAST BIOPSY LEFT (ALL INC) Left 07/10/2017    NY EXC B9 LESION MRGN XCP SK TG S/N/H/F/G 1.1-2.0CM Right 12/06/2017    Procedure: EXCISION NODULE CERVICAL REGION;  Surgeon: Kylee Simental MD;  [facility];  Service: General    NY LAPS TOTAL HYSTERECT 250 GM/< W/RMVL TUBE/OVARY N/A 05/27/2021    Procedure: ROBOTIC HYSTERECTOMY, BILATERAL SALPINGECTOMY, LEFT OOPHORECTOMY; ABLATION OF ENDOMETRIOSIS;  Surgeon: Kimi Sanchez MD;  [facility];  Service: Gynecology Oncology       Review of Systems   Respiratory: Negative. Cardiovascular: Negative. Gastrointestinal: Negative for constipation and diarrhea. Genitourinary: Negative for difficulty urinating, pelvic pain, vaginal bleeding, vaginal discharge, itching, or odor. O:  Blood pressure 110/70, height 5' 6.5" (1.689 m), weight 63.7 kg (140 lb 6.4 oz), last menstrual period 05/16/2021, not currently breastfeeding. Patient appears well and is not in distress  Breasts are symmetrical without mass, tenderness, nipple discharge, skin changes or adenopathy. Abdomen is soft and nontender without masses. External genitals are normal without lesions or rashes. Urethral meatus and urethra are normal  Vagina is normal without discharge or bleeding. Cervix and uterus are surgically absent.   Adnexa have no masses, nontender     A:   Yearly exam.     P:   Pap collected today of cuff  Mammo slip provided   Colon cancer screening is up to date   DEXA    RTO one year for yearly exam or sooner as needed.

## 2023-11-23 LAB
LAB AP GYN PRIMARY INTERPRETATION: NORMAL
Lab: NORMAL

## 2023-12-05 ENCOUNTER — OFFICE VISIT (OUTPATIENT)
Dept: FAMILY MEDICINE CLINIC | Facility: CLINIC | Age: 53
End: 2023-12-05
Payer: COMMERCIAL

## 2023-12-05 VITALS
HEIGHT: 67 IN | HEART RATE: 86 BPM | BODY MASS INDEX: 21.97 KG/M2 | DIASTOLIC BLOOD PRESSURE: 72 MMHG | WEIGHT: 140 LBS | SYSTOLIC BLOOD PRESSURE: 118 MMHG | OXYGEN SATURATION: 98 %

## 2023-12-05 DIAGNOSIS — R68.89 HEAT INTOLERANCE: Primary | ICD-10-CM

## 2023-12-05 DIAGNOSIS — M79.10 MYALGIA: ICD-10-CM

## 2023-12-05 DIAGNOSIS — E55.9 VITAMIN D DEFICIENCY: ICD-10-CM

## 2023-12-05 PROCEDURE — 99213 OFFICE O/P EST LOW 20 MIN: CPT | Performed by: FAMILY MEDICINE

## 2023-12-05 NOTE — PROGRESS NOTES
Assessment/Plan:   Diagnoses and all orders for this visit:    Heat intolerance  -     TSH, 3rd generation with Free T4 reflex    Myalgia  -     CBC and differential; Future  -     Iron Panel (Includes Ferritin, Iron Sat%, Iron, and TIBC); Future  -     Comprehensive metabolic panel; Future  -     Vitamin D 25 hydroxy; Future  -     Vitamin B12; Future  - (+) constellation of s/s with benign physical exam   - per pt, has tested COVID NEG multiple times   - advised to cont OTC Tylenol/Motrin, rest and hydration   - will check labs and advised to f/u with PCP - pt aware and agreeable     Vitamin D deficiency  -     Vitamin D 25 hydroxy; Future          Subjective:    Patient ID: Yanelis Deleon is a 48 y.o. female. HPI  50yo F presents to the office for eval of constellation of s/s   - has had a HA since Thursday   - last eye exam was 1yr ago   - multiple NEG COVID tests  - mid-low back pain since Thursday - had worked out 1-2days prior   - (+) achy legs for the past 1-2days   - denies F/earache/photo or phonophobia/sinus pressure/sore throat/CP/palpitations/SOB/urinary pain  - (+) intermittent chills,   - (+) hot flashes - but also in menopause  - Friday night felt like unable to sleep   - Motrin alleviates s/s   - UTD with annual Flu vaccine and latest COVID IMM      The following portions of the patient's history were reviewed and updated as appropriate: allergies, current medications, past family history, past medical history, past social history, past surgical history and problem list.    Review of Systems  as per HPI    Objective:  /72   Pulse 86   Ht 5' 6.5" (1.689 m)   Wt 63.5 kg (140 lb)   LMP 05/16/2021 (Exact Date)   SpO2 98%   BMI 22.26 kg/m²    Physical Exam  Vitals reviewed. Constitutional:       General: She is not in acute distress. Appearance: Normal appearance. She is not ill-appearing, toxic-appearing or diaphoretic. HENT:      Head: Normocephalic and atraumatic.       Right Ear: Tympanic membrane, ear canal and external ear normal. There is no impacted cerumen. Left Ear: Tympanic membrane, ear canal and external ear normal. There is no impacted cerumen. Nose: Nose normal. No congestion. Mouth/Throat:      Mouth: Mucous membranes are moist.      Pharynx: Oropharynx is clear. No oropharyngeal exudate or posterior oropharyngeal erythema. Eyes:      General: No scleral icterus. Right eye: No discharge. Left eye: No discharge. Extraocular Movements: Extraocular movements intact. Conjunctiva/sclera: Conjunctivae normal.   Cardiovascular:      Rate and Rhythm: Normal rate and regular rhythm. Heart sounds: Normal heart sounds. No murmur heard. No friction rub. No gallop. Pulmonary:      Effort: Pulmonary effort is normal. No respiratory distress. Breath sounds: Normal breath sounds. No stridor. No wheezing, rhonchi or rales. Abdominal:      Palpations: Abdomen is soft. Tenderness: There is no abdominal tenderness. Musculoskeletal:         General: No tenderness. Normal range of motion. Cervical back: Normal range of motion. Right lower leg: No edema. Left lower leg: No edema. Skin:     General: Skin is warm. Neurological:      General: No focal deficit present. Mental Status: She is alert and oriented to person, place, and time.    Psychiatric:         Mood and Affect: Mood normal.         Behavior: Behavior normal.

## 2023-12-06 ENCOUNTER — APPOINTMENT (OUTPATIENT)
Dept: LAB | Facility: CLINIC | Age: 53
End: 2023-12-06
Payer: COMMERCIAL

## 2023-12-06 DIAGNOSIS — M79.10 MYALGIA: ICD-10-CM

## 2023-12-06 DIAGNOSIS — E55.9 VITAMIN D DEFICIENCY: ICD-10-CM

## 2023-12-06 LAB
25(OH)D3 SERPL-MCNC: 51.7 NG/ML (ref 30–100)
ALBUMIN SERPL BCP-MCNC: 4.6 G/DL (ref 3.5–5)
ALP SERPL-CCNC: 63 U/L (ref 34–104)
ALT SERPL W P-5'-P-CCNC: 11 U/L (ref 7–52)
ANION GAP SERPL CALCULATED.3IONS-SCNC: 5 MMOL/L
AST SERPL W P-5'-P-CCNC: 17 U/L (ref 13–39)
BASOPHILS # BLD AUTO: 0.03 THOUSANDS/ÂΜL (ref 0–0.1)
BASOPHILS NFR BLD AUTO: 1 % (ref 0–1)
BILIRUB SERPL-MCNC: 0.46 MG/DL (ref 0.2–1)
BUN SERPL-MCNC: 13 MG/DL (ref 5–25)
CALCIUM SERPL-MCNC: 9.4 MG/DL (ref 8.4–10.2)
CHLORIDE SERPL-SCNC: 102 MMOL/L (ref 96–108)
CO2 SERPL-SCNC: 31 MMOL/L (ref 21–32)
CREAT SERPL-MCNC: 0.74 MG/DL (ref 0.6–1.3)
EOSINOPHIL # BLD AUTO: 0.17 THOUSAND/ÂΜL (ref 0–0.61)
EOSINOPHIL NFR BLD AUTO: 3 % (ref 0–6)
ERYTHROCYTE [DISTWIDTH] IN BLOOD BY AUTOMATED COUNT: 13.1 % (ref 11.6–15.1)
FERRITIN SERPL-MCNC: 31 NG/ML (ref 11–307)
GFR SERPL CREATININE-BSD FRML MDRD: 92 ML/MIN/1.73SQ M
GLUCOSE P FAST SERPL-MCNC: 104 MG/DL (ref 65–99)
HCT VFR BLD AUTO: 44.4 % (ref 34.8–46.1)
HGB BLD-MCNC: 14.8 G/DL (ref 11.5–15.4)
IMM GRANULOCYTES # BLD AUTO: 0.01 THOUSAND/UL (ref 0–0.2)
IMM GRANULOCYTES NFR BLD AUTO: 0 % (ref 0–2)
IRON SATN MFR SERPL: 25 % (ref 15–50)
IRON SERPL-MCNC: 82 UG/DL (ref 50–212)
LYMPHOCYTES # BLD AUTO: 2 THOUSANDS/ÂΜL (ref 0.6–4.47)
LYMPHOCYTES NFR BLD AUTO: 34 % (ref 14–44)
MCH RBC QN AUTO: 32.5 PG (ref 26.8–34.3)
MCHC RBC AUTO-ENTMCNC: 33.3 G/DL (ref 31.4–37.4)
MCV RBC AUTO: 97 FL (ref 82–98)
MONOCYTES # BLD AUTO: 0.64 THOUSAND/ÂΜL (ref 0.17–1.22)
MONOCYTES NFR BLD AUTO: 11 % (ref 4–12)
NEUTROPHILS # BLD AUTO: 2.98 THOUSANDS/ÂΜL (ref 1.85–7.62)
NEUTS SEG NFR BLD AUTO: 51 % (ref 43–75)
NRBC BLD AUTO-RTO: 0 /100 WBCS
PLATELET # BLD AUTO: 238 THOUSANDS/UL (ref 149–390)
PMV BLD AUTO: 11.2 FL (ref 8.9–12.7)
POTASSIUM SERPL-SCNC: 4.1 MMOL/L (ref 3.5–5.3)
PROT SERPL-MCNC: 6.7 G/DL (ref 6.4–8.4)
RBC # BLD AUTO: 4.56 MILLION/UL (ref 3.81–5.12)
SODIUM SERPL-SCNC: 138 MMOL/L (ref 135–147)
TIBC SERPL-MCNC: 333 UG/DL (ref 250–450)
TSH SERPL DL<=0.05 MIU/L-ACNC: 1.34 UIU/ML (ref 0.45–4.5)
UIBC SERPL-MCNC: 251 UG/DL (ref 155–355)
VIT B12 SERPL-MCNC: 309 PG/ML (ref 180–914)
WBC # BLD AUTO: 5.83 THOUSAND/UL (ref 4.31–10.16)

## 2023-12-06 PROCEDURE — 85025 COMPLETE CBC W/AUTO DIFF WBC: CPT

## 2023-12-06 PROCEDURE — 83540 ASSAY OF IRON: CPT

## 2023-12-06 PROCEDURE — 80053 COMPREHEN METABOLIC PANEL: CPT

## 2023-12-06 PROCEDURE — 83550 IRON BINDING TEST: CPT

## 2023-12-06 PROCEDURE — 82607 VITAMIN B-12: CPT

## 2023-12-06 PROCEDURE — 36415 COLL VENOUS BLD VENIPUNCTURE: CPT

## 2023-12-06 PROCEDURE — 82306 VITAMIN D 25 HYDROXY: CPT

## 2023-12-06 PROCEDURE — 84443 ASSAY THYROID STIM HORMONE: CPT | Performed by: FAMILY MEDICINE

## 2023-12-06 PROCEDURE — 82728 ASSAY OF FERRITIN: CPT

## 2024-01-25 ENCOUNTER — OFFICE VISIT (OUTPATIENT)
Dept: FAMILY MEDICINE CLINIC | Facility: CLINIC | Age: 54
End: 2024-01-25
Payer: COMMERCIAL

## 2024-01-25 VITALS
DIASTOLIC BLOOD PRESSURE: 70 MMHG | BODY MASS INDEX: 21.97 KG/M2 | HEART RATE: 77 BPM | OXYGEN SATURATION: 99 % | SYSTOLIC BLOOD PRESSURE: 116 MMHG | WEIGHT: 140 LBS | HEIGHT: 67 IN

## 2024-01-25 DIAGNOSIS — Z12.11 SCREENING FOR COLORECTAL CANCER: ICD-10-CM

## 2024-01-25 DIAGNOSIS — Z00.00 ANNUAL PHYSICAL EXAM: ICD-10-CM

## 2024-01-25 DIAGNOSIS — R73.01 ELEVATED FASTING BLOOD SUGAR: ICD-10-CM

## 2024-01-25 DIAGNOSIS — Z12.12 SCREENING FOR COLORECTAL CANCER: ICD-10-CM

## 2024-01-25 DIAGNOSIS — Z00.00 PREVENTATIVE HEALTH CARE: Primary | ICD-10-CM

## 2024-01-25 PROCEDURE — 99396 PREV VISIT EST AGE 40-64: CPT | Performed by: FAMILY MEDICINE

## 2024-01-25 NOTE — ASSESSMENT & PLAN NOTE
Metabolic labs reviewed with patient, noted to have borderline fasting blood sugar of 104.  Patient follows a very strict low-carb diet and exercise routine.  Recommended to continue same.  Repeat metabolic labs/follow-up at 6-month.

## 2024-01-25 NOTE — PROGRESS NOTES
ADULT ANNUAL PHYSICAL  [facility]    NAME: Mirna Ly  AGE: 53 y.o. SEX: female  : 1970     DATE: 2024     Assessment and Plan:     Problem List Items Addressed This Visit          Other    Preventative health care - Primary     Patient is up-to-date on breast cancer screening.  Due for colorectal cancer screening via colonoscopy.  S/p hysterectomy.  Up-to-date on all vaccines including tetanus, influenza.  Patient will get her shingles booster at local pharmacy.         Elevated fasting blood sugar     Metabolic labs reviewed with patient, noted to have borderline fasting blood sugar of 104.  Patient follows a very strict low-carb diet and exercise routine.  Recommended to continue same.  Repeat metabolic labs/follow-up at 6-month.         Relevant Orders    Comprehensive metabolic panel    Hemoglobin A1C     Other Visit Diagnoses       Screening for colorectal cancer        Relevant Orders    Ambulatory referral to Gastroenterology    Annual physical exam                Immunizations and preventive care screenings were discussed with patient today. Appropriate education was printed on patient's after visit summary.    Counseling:  Dental Health: discussed importance of regular tooth brushing, flossing, and dental visits.  Exercise: the importance of regular exercise/physical activity was discussed. Recommend exercise 3-5 times per week for at least 30 minutes.       Depression Screening and Follow-up Plan: Patient was screened for depression during today's encounter. They screened negative with a PHQ-2 score of 0.        No follow-ups on file.     Chief Complaint:     Chief Complaint   Patient presents with    Physical Exam      History of Present Illness:     Adult Annual Physical   Patient here for a comprehensive physical exam. The patient reports no problems.    Diet and Physical Activity  Diet/Nutrition: well balanced diet.    Exercise: moderate cardiovascular exercise.      Depression Screening  PHQ-2/9 Depression Screening    Little interest or pleasure in doing things: 0 - not at all  Feeling down, depressed, or hopeless: 0 - not at all  PHQ-2 Score: 0  PHQ-2 Interpretation: Negative depression screen       General Health  Sleep: sleeps well.   Hearing: normal - bilateral.  Vision: no vision problems.   Dental: regular dental visits.       /GYN Health  S/p hysterectomy.         Review of Systems:     Review of Systems   Constitutional: Negative.    Respiratory: Negative.     Cardiovascular: Negative.       Past Medical History:     Past Medical History:   Diagnosis Date    Abnormal Pap smear of cervix     Acute sinusitis     Allergic rhinitis     Onset: [id number]    Anemia     Dysplasia of cervix     MILD  LAST ASSESSED 8/18/2014    Head ache     HPV in female     LGSIL of cervix of undetermined significance     Low grade squamous intraepithelial lesion (LGSIL) on Papanicolaou smear of cervix     Resolved: 2014    Mass of chin     Mild dysplasia of cervix (ELIANA I)     Last Assessed: [id number]    Mononucleosis     Resolved: 1992    Normal delivery     2003 son, 2005 daughter    Pyelonephritis     UTI (urinary tract infection)       Past Surgical History:     Past Surgical History:   Procedure Laterality Date    BREAST BIOPSY Left 2017    CERVICAL BIOPSY  W/ LOOP ELECTRODE EXCISION      COLPOSCOPY W/ BIOPSY / CURETTAGE  2014, 2018    LSIL    HYSTERECTOMY      MAMMO STEREOTACTIC BREAST BIOPSY LEFT (ALL INC) Left 07/10/2017    MI EXC B9 LESION MRGN XCP SK TG S/N/H/F/G 1.1-2.0CM Right 12/06/2017    Procedure: EXCISION NODULE CERVICAL REGION;  Surgeon: Walter Dillard MD;  [facility];  Service: General    MI LAPS TOTAL HYSTERECT 250 GM/< W/RMVL TUBE/OVARY N/A 05/27/2021    Procedure: ROBOTIC HYSTERECTOMY, BILATERAL SALPINGECTOMY, LEFT OOPHORECTOMY; ABLATION OF ENDOMETRIOSIS;  Surgeon: Saniya Graf MD;  [facility];   Service: Gynecology Oncology      Social History:     Social History     Socioeconomic History    Marital status:      Spouse name: None    Number of children: None    Years of education: None    Highest education level: None   Occupational History    Occupation: Professor-Civil Engineering   Tobacco Use    Smoking status: Never    Smokeless tobacco: Never   Vaping Use    Vaping status: Never Used   Substance and Sexual Activity    Alcohol use: Yes     Alcohol/week: 4.0 standard drinks of alcohol     Types: 4 Standard drinks or equivalent per week     Comment: Socially    Drug use: Never    Sexual activity: Yes     Partners: Male     Birth control/protection: Male Sterilization, Female Sterilization     Comment: partner vasectomy   Other Topics Concern    None   Social History Narrative    Daily Coffee [place]um[place]: 2 cups/day    Exercising regularly     Social Determinants of Health     Financial Resource Strain: Not on file   Food Insecurity: Not on file   Transportation Needs: Not on file   Physical Activity: Not on file   Stress: Not on file   Social Connections: Not on file   Intimate Partner Violence: Not on file   Housing Stability: Not on file      Family History:     Family History   Problem Relation Age of Onset    Miscarriages / Stillbirths Mother     Scleroderma Father     No Known Problems Daughter     Prostate cancer Maternal Grandfather     Cancer Maternal Grandfather         prostate cancer    Heart attack Maternal Grandfather     ALS Paternal Grandmother     Parkinsonism Paternal Grandfather     Cancer Paternal Aunt     No Known Problems Paternal Aunt     Arthritis Family     Cystic fibrosis Family         patient not a carrier    Other Family         Headache Syndromes    Down syndrome Family         nephew    Osteoporosis Family     No Known Problems Son     No Known Problems Maternal Aunt       Current Medications:     Current Outpatient Medications   Medication Sig Dispense Refill     "Cholecalciferol (VITAMIN D3 PO) Take 2,000 Units by mouth daily       fluticasone (FLONASE) 50 mcg/act nasal spray 1 spray into each nostril daily 18 g 0    loratadine (CLARITIN) 10 mg tablet Take 10 mg by mouth daily       No current facility-administered medications for this visit.      Allergies:     Allergies   Allergen Reactions    Codeine GI Intolerance     Reaction Date: 17Nov2011;   Reaction Date: 11Feb2008;       Physical Exam:     /70   Pulse 77   Ht 5' 6.5\" (1.689 m)   Wt 63.5 kg (140 lb)   LMP 05/16/2021 (Exact Date)   SpO2 99%   BMI 22.26 kg/m²     Physical Exam  Constitutional:       Appearance: Normal appearance.   HENT:      Right Ear: Tympanic membrane normal.      Left Ear: Tympanic membrane normal.      Mouth/Throat:      Pharynx: No posterior oropharyngeal erythema.   Eyes:      Pupils: Pupils are equal, round, and reactive to light.   Cardiovascular:      Heart sounds: Normal heart sounds.   Pulmonary:      Breath sounds: Normal breath sounds.   Abdominal:      Palpations: Abdomen is soft.   Musculoskeletal:      Right lower leg: No edema.      Left lower leg: No edema.   Lymphadenopathy:      Cervical: No cervical adenopathy.   Neurological:      Mental Status: She is oriented to person, place, and time.   Psychiatric:         Mood and Affect: Mood normal.          Tessy Marcelo MD  [facility]    "

## 2024-01-25 NOTE — ASSESSMENT & PLAN NOTE
Patient is up-to-date on breast cancer screening.  Due for colorectal cancer screening via colonoscopy.  S/p hysterectomy.  Up-to-date on all vaccines including tetanus, influenza.  Patient will get her shingles booster at local pharmacy.

## 2024-02-29 ENCOUNTER — ESTABLISHED COMPREHENSIVE EXAM (OUTPATIENT)
Dept: URBAN - METROPOLITAN AREA CLINIC 6 | Facility: CLINIC | Age: 54
End: 2024-02-29

## 2024-02-29 DIAGNOSIS — H52.13: ICD-10-CM

## 2024-02-29 DIAGNOSIS — H25.013: ICD-10-CM

## 2024-02-29 DIAGNOSIS — Z01.00: ICD-10-CM

## 2024-02-29 PROCEDURE — 92015 DETERMINE REFRACTIVE STATE: CPT

## 2024-02-29 PROCEDURE — 92012 INTRM OPH EXAM EST PATIENT: CPT

## 2024-02-29 PROCEDURE — 92310 CONTACT LENS FITTING OU: CPT

## 2024-02-29 ASSESSMENT — KERATOMETRY
OS_K2POWER_DIOPTERS: 45.25
OS_K1POWER_DIOPTERS: 45.25
OD_K2POWER_DIOPTERS: 44.75
OS_AXISANGLE2_DEGREES: 90
OS_AXISANGLE_DEGREES: 180
OD_AXISANGLE2_DEGREES: 90
OD_AXISANGLE_DEGREES: 180
OD_K1POWER_DIOPTERS: 44.75

## 2024-02-29 ASSESSMENT — TONOMETRY
OD_IOP_MMHG: 12
OS_IOP_MMHG: 14

## 2024-02-29 ASSESSMENT — VISUAL ACUITY
OS_CC: 20/30
OD_CC: 20/25-1
OS_PH: 20/20

## 2024-03-14 ENCOUNTER — FOLLOW UP (OUTPATIENT)
Dept: URBAN - METROPOLITAN AREA CLINIC 6 | Facility: CLINIC | Age: 54
End: 2024-03-14

## 2024-03-14 DIAGNOSIS — H25.013: ICD-10-CM

## 2024-03-14 DIAGNOSIS — Z46.0: ICD-10-CM

## 2024-03-14 DIAGNOSIS — H52.13: ICD-10-CM

## 2024-03-14 PROCEDURE — 92310 CONTACT LENS FITTING OU: CPT

## 2024-03-14 PROCEDURE — 92014 COMPRE OPH EXAM EST PT 1/>: CPT

## 2024-03-14 ASSESSMENT — KERATOMETRY
OS_K1POWER_DIOPTERS: 45.25
OS_AXISANGLE2_DEGREES: 90
OS_K2POWER_DIOPTERS: 45.25
OD_K1POWER_DIOPTERS: 44.75
OD_AXISANGLE_DEGREES: 180
OD_K2POWER_DIOPTERS: 44.75
OS_AXISANGLE_DEGREES: 180
OD_AXISANGLE2_DEGREES: 90

## 2024-03-14 ASSESSMENT — VISUAL ACUITY
OS_CC: 20/40
OD_CC: 20/40

## 2024-11-06 ENCOUNTER — HOSPITAL ENCOUNTER (OUTPATIENT)
Dept: MAMMOGRAPHY | Facility: HOSPITAL | Age: 54
Discharge: HOME | End: 2024-11-06
Attending: OBSTETRICS & GYNECOLOGY
Payer: COMMERCIAL

## 2024-11-06 VITALS — BODY MASS INDEX: 21.97 KG/M2 | HEIGHT: 67 IN | WEIGHT: 140 LBS

## 2024-11-06 DIAGNOSIS — Z12.31 ENCOUNTER FOR SCREENING MAMMOGRAM FOR MALIGNANT NEOPLASM OF BREAST: ICD-10-CM

## 2024-11-06 PROCEDURE — 77063 BREAST TOMOSYNTHESIS BI: CPT

## 2024-11-06 PROCEDURE — 77067 SCR MAMMO BI INCL CAD: CPT

## 2024-11-15 ENCOUNTER — TELEPHONE (OUTPATIENT)
Age: 54
End: 2024-11-15

## 2024-11-15 ENCOUNTER — PREP FOR PROCEDURE (OUTPATIENT)
Age: 54
End: 2024-11-15

## 2024-11-15 DIAGNOSIS — Z12.11 SCREENING FOR COLON CANCER: Primary | ICD-10-CM

## 2024-11-15 NOTE — TELEPHONE ENCOUNTER
11/15/24  Screened by: Nithya Reeves    Referring Provider Dr. Marcelo    Pre- Screening:     There is no height or weight on file to calculate BMI.  22.26  Has patient been referred for a routine screening Colonoscopy? yes  Is the patient between 45-75 years old? yes      Previous Colonoscopy no     If yes:    Date:     Facility:     Reason:           Does the patient want to see a Gastroenterologist prior to their procedure OR are they having any GI symptoms? no    Has the patient been hospitalized or had abdominal surgery in the past 6 months? no    Does the patient use supplemental oxygen? no    Does the patient take Coumadin, Lovenox, Plavix, Elliquis, Xarelto, or other blood thinning medication? no    Has the patient had a stroke, cardiac event, or stent placed in the past year? no        If patient is between 45yrs - 49yrs, please advise patient that we will have to confirm benefits & coverage with their insurance company for a routine screening colonoscopy.

## 2024-11-15 NOTE — TELEPHONE ENCOUNTER
Scheduled date of colonoscopy (as of today):   1/30/25    Physician performing colonoscopy: Dr. Lim    [facility] of colon[facility]    Bowel prep reviewed with patient:  [email or web address]

## 2024-11-19 ENCOUNTER — ANNUAL EXAM (OUTPATIENT)
Age: 54
End: 2024-11-19
Payer: COMMERCIAL

## 2024-11-19 VITALS
DIASTOLIC BLOOD PRESSURE: 70 MMHG | SYSTOLIC BLOOD PRESSURE: 116 MMHG | WEIGHT: 145.2 LBS | BODY MASS INDEX: 22.79 KG/M2 | HEIGHT: 67 IN

## 2024-11-19 DIAGNOSIS — R92.343 EXTREMELY DENSE TISSUE OF BOTH BREASTS ON MAMMOGRAPHY: ICD-10-CM

## 2024-11-19 DIAGNOSIS — Z01.419 ENCOUNTER FOR ANNUAL ROUTINE GYNECOLOGICAL EXAMINATION: Primary | ICD-10-CM

## 2024-11-19 DIAGNOSIS — Z12.39 BREAST CANCER SCREENING OTHER THAN MAMMOGRAM: ICD-10-CM

## 2024-11-19 DIAGNOSIS — Z12.31 SCREENING MAMMOGRAM FOR BREAST CANCER: ICD-10-CM

## 2024-11-19 PROCEDURE — S0612 ANNUAL GYNECOLOGICAL EXAMINA: HCPCS | Performed by: OBSTETRICS & GYNECOLOGY

## 2024-11-19 NOTE — PROGRESS NOTES
Mirna FLETCHER Malachi   1970    CC:  Yearly exam    S:  54 y.o. female here for yearly exam. She is s/p LINK, BS, L oophorectomy, endometriosis ablation (benign) in 2021. She denies vaginal bleeding.     She denies vaginal discharge, itching, odor or dryness.     She has no urinary, bowel, breast concerns.     She is sexually active without pain, bleeding or dryness.    Last Pap 11/15/2023 - NILM, Neg HPV ( ELIANA II 3/2018)  Last Mammo 11/6/24 - BIRad 1  Last Colonoscopy Cologard 2/21, has plans    Family hx of breast cancer: no  Family hx of ovarian cancer: no  Family hx of colon cancer: no      Current Outpatient Medications:     Cholecalciferol (VITAMIN D3 PO), Take 2,000 Units by mouth daily , Disp: , Rfl:     fluticasone (FLONASE) 50 mcg/act nasal spray, 1 spray into each nostril daily, Disp: 18 g, Rfl: 0    loratadine (CLARITIN) 10 mg tablet, Take 10 mg by mouth daily, Disp: , Rfl:     Patient Active Problem List   Diagnosis    Hematuria    Mammogram abnormal    Pleomorphic adenoma of submandibular gland    Submandibular gland hypertrophy    Increased frequency of urination    Vitamin D deficiency    Moderate dysplasia of cervix (ELIANA II)    Neck pain    Muscle spasm    Contact dermatitis due to poison ivy    Preventative health care    Iron deficiency anemia    Menstrual irregularity    Plantar fasciitis, bilateral    Left ovarian cyst    Motion sickness    History of robot-assisted laparoscopic hysterectomy    Lipoma of torso    Elevated fasting blood sugar     Past Medical History:   Diagnosis Date    Abnormal Pap smear of cervix 2018    Have had 3 or 4 over the years, 2018 is most recent    Acute sinusitis     Allergic rhinitis     Onset: [id number]    Anemia     Dysplasia of cervix     MILD  LAST ASSESSED 8/18/2014    Head ache     HPV in female     LGSIL of cervix of undetermined significance     Low grade squamous intraepithelial lesion (LGSIL) on Papanicolaou smear of cervix     Resolved: 2014    Mass of  "chin     Mild dysplasia of cervix (ELIANA I)     Last Assessed: [id number]    Mononucleosis     Resolved: 1992    Normal delivery     2003 son, 2005 daughter    Pyelonephritis     UTI (urinary tract infection)      Past Surgical History:   Procedure Laterality Date    BREAST BIOPSY Left 2016    CERVICAL BIOPSY  W/ LOOP ELECTRODE EXCISION  2018    COLPOSCOPY W/ BIOPSY / CURETTAGE  2014, 2018    LSIL    HYSTERECTOMY      MAMMO STEREOTACTIC BREAST BIOPSY LEFT (ALL INC) Left 07/10/2017    PA EXC B9 LESION MRGN XCP SK TG S/N/H/F/G 1.1-2.0CM Right 12/06/2017    Procedure: EXCISION NODULE CERVICAL REGION;  Surgeon: Walter Dillard MD;  [facility]: [facility];  Service: General    PA LAPS TOTAL HYSTERECT 250 GM/< W/RMVL TUBE/OVARY N/A 05/27/2021    Procedure: ROBOTIC HYSTERECTOMY, BILATERAL SALPINGECTOMY, LEFT OOPHORECTOMY; ABLATION OF ENDOMETRIOSIS;  Surgeon: Saniya Graf MD;  Location: [facility];  Service: Gynecology Oncology       Review of Systems   Respiratory: Negative.    Cardiovascular: Negative.    Gastrointestinal: Negative for constipation and diarrhea.   Genitourinary: Negative for difficulty urinating, pelvic pain, vaginal bleeding, vaginal discharge, itching, or odor.    O:  Blood pressure 116/70, height 5' 6.5\" (1.689 m), weight 65.9 kg (145 lb 3.2 oz), last menstrual period 05/16/2021, not currently breastfeeding.    Patient appears well and is not in distress  Breasts are symmetrical without mass, tenderness, nipple discharge, skin changes or adenopathy.   Abdomen is soft and nontender without masses.   External genitals are normal without lesions or rashes.  Urethral meatus and urethra are normal  Vagina is normal without discharge or bleeding.   Cervix and uterus are surgically absent.  Adnexa have no masses, nontender     A:   Yearly exam, Dense Breasts.     P:   Mammo ordered, ABUS ordered   Colonoscopy - scheduled in January!   RTO one year for yearly exam or sooner as needed.      "

## 2024-11-20 ENCOUNTER — RESULTS FOLLOW-UP (OUTPATIENT)
Dept: FAMILY MEDICINE CLINIC | Facility: CLINIC | Age: 54
End: 2024-11-20

## 2024-11-20 LAB
ALBUMIN SERPL-MCNC: 4.5 G/DL (ref 3.6–5.1)
ALBUMIN/GLOB SERPL: 2.5 (CALC) (ref 1–2.5)
ALP SERPL-CCNC: 60 U/L (ref 37–153)
ALT SERPL-CCNC: 15 U/L (ref 6–29)
AST SERPL-CCNC: 22 U/L (ref 10–35)
BILIRUB SERPL-MCNC: 0.8 MG/DL (ref 0.2–1.2)
BUN SERPL-MCNC: 13 MG/DL (ref 7–25)
BUN/CREAT SERPL: ABNORMAL (CALC) (ref 6–22)
CALCIUM SERPL-MCNC: 9.8 MG/DL (ref 8.6–10.4)
CHLORIDE SERPL-SCNC: 105 MMOL/L (ref 98–110)
CO2 SERPL-SCNC: 27 MMOL/L (ref 20–32)
CREAT SERPL-MCNC: 0.81 MG/DL (ref 0.5–1.03)
GFR/BSA.PRED SERPLBLD CYS-BASED-ARV: 86 ML/MIN/1.73M2
GLOBULIN SER CALC-MCNC: 1.8 G/DL (CALC) (ref 1.9–3.7)
GLUCOSE SERPL-MCNC: 92 MG/DL (ref 65–99)
HBA1C MFR BLD: 5.4 % OF TOTAL HGB
POTASSIUM SERPL-SCNC: 4.3 MMOL/L (ref 3.5–5.3)
PROT SERPL-MCNC: 6.3 G/DL (ref 6.1–8.1)
SODIUM SERPL-SCNC: 141 MMOL/L (ref 135–146)

## 2024-12-10 ENCOUNTER — TELEPHONE (OUTPATIENT)
Age: 54
End: 2024-12-10

## 2024-12-10 NOTE — TELEPHONE ENCOUNTER
Patient calls in regards to labs resulted from 11 20 2024. Patient states she never received a call in regards to her results.  Message from the doctor read to the patient.  Patient would like to know what the low Globulin level means?    Please review and call the patient back.

## 2025-01-16 ENCOUNTER — ANESTHESIA EVENT (OUTPATIENT)
Dept: ANESTHESIOLOGY | Facility: HOSPITAL | Age: 55
End: 2025-01-16

## 2025-01-16 ENCOUNTER — ANESTHESIA (OUTPATIENT)
Dept: ANESTHESIOLOGY | Facility: HOSPITAL | Age: 55
End: 2025-01-16

## 2025-01-24 ENCOUNTER — TELEPHONE (OUTPATIENT)
Dept: GASTROENTEROLOGY | Facility: AMBULARY SURGERY CENTER | Age: 55
End: 2025-01-24

## 2025-01-30 ENCOUNTER — ANESTHESIA (OUTPATIENT)
Dept: GASTROENTEROLOGY | Facility: AMBULARY SURGERY CENTER | Age: 55
End: 2025-01-30
Payer: COMMERCIAL

## 2025-01-30 ENCOUNTER — HOSPITAL ENCOUNTER (OUTPATIENT)
Dept: GASTROENTEROLOGY | Facility: AMBULARY SURGERY CENTER | Age: 55
Setting detail: OUTPATIENT SURGERY
End: 2025-01-30
Attending: INTERNAL MEDICINE
Payer: COMMERCIAL

## 2025-01-30 VITALS
SYSTOLIC BLOOD PRESSURE: 100 MMHG | HEIGHT: 66 IN | BODY MASS INDEX: 23.78 KG/M2 | TEMPERATURE: 97.7 F | HEART RATE: 63 BPM | OXYGEN SATURATION: 98 % | RESPIRATION RATE: 18 BRPM | WEIGHT: 148 LBS | DIASTOLIC BLOOD PRESSURE: 63 MMHG

## 2025-01-30 DIAGNOSIS — Z12.11 SCREENING FOR COLON CANCER: ICD-10-CM

## 2025-01-30 PROCEDURE — 88305 TISSUE EXAM BY PATHOLOGIST: CPT | Performed by: PATHOLOGY

## 2025-01-30 PROCEDURE — 45380 COLONOSCOPY AND BIOPSY: CPT | Performed by: INTERNAL MEDICINE

## 2025-01-30 RX ORDER — LIDOCAINE HYDROCHLORIDE 10 MG/ML
INJECTION, SOLUTION EPIDURAL; INFILTRATION; INTRACAUDAL; PERINEURAL AS NEEDED
Status: DISCONTINUED | OUTPATIENT
Start: 2025-01-30 | End: 2025-01-30

## 2025-01-30 RX ORDER — PROPOFOL 10 MG/ML
INJECTION, EMULSION INTRAVENOUS AS NEEDED
Status: DISCONTINUED | OUTPATIENT
Start: 2025-01-30 | End: 2025-01-30

## 2025-01-30 RX ORDER — SODIUM CHLORIDE, SODIUM LACTATE, POTASSIUM CHLORIDE, CALCIUM CHLORIDE 600; 310; 30; 20 MG/100ML; MG/100ML; MG/100ML; MG/100ML
INJECTION, SOLUTION INTRAVENOUS CONTINUOUS PRN
Status: DISCONTINUED | OUTPATIENT
Start: 2025-01-30 | End: 2025-01-30

## 2025-01-30 RX ADMIN — PROPOFOL 30 MG: 10 INJECTION, EMULSION INTRAVENOUS at 09:55

## 2025-01-30 RX ADMIN — PROPOFOL 40 MG: 10 INJECTION, EMULSION INTRAVENOUS at 09:47

## 2025-01-30 RX ADMIN — PROPOFOL 30 MG: 10 INJECTION, EMULSION INTRAVENOUS at 09:43

## 2025-01-30 RX ADMIN — Medication 40 MG: at 09:49

## 2025-01-30 RX ADMIN — SODIUM CHLORIDE, SODIUM LACTATE, POTASSIUM CHLORIDE, AND CALCIUM CHLORIDE: .6; .31; .03; .02 INJECTION, SOLUTION INTRAVENOUS at 09:38

## 2025-01-30 RX ADMIN — PROPOFOL 40 MG: 10 INJECTION, EMULSION INTRAVENOUS at 09:51

## 2025-01-30 RX ADMIN — PROPOFOL 80 MG: 10 INJECTION, EMULSION INTRAVENOUS at 09:42

## 2025-01-30 RX ADMIN — PROPOFOL 40 MG: 10 INJECTION, EMULSION INTRAVENOUS at 09:49

## 2025-01-30 RX ADMIN — PROPOFOL 40 MG: 10 INJECTION, EMULSION INTRAVENOUS at 09:52

## 2025-01-30 RX ADMIN — PROPOFOL 40 MG: 10 INJECTION, EMULSION INTRAVENOUS at 09:45

## 2025-01-30 RX ADMIN — PROPOFOL 40 MG: 10 INJECTION, EMULSION INTRAVENOUS at 09:54

## 2025-01-30 RX ADMIN — PROPOFOL 20 MG: 10 INJECTION, EMULSION INTRAVENOUS at 09:48

## 2025-01-30 RX ADMIN — LIDOCAINE HYDROCHLORIDE 50 MG: 10 INJECTION, SOLUTION EPIDURAL; INFILTRATION; INTRACAUDAL at 09:42

## 2025-01-30 RX ADMIN — PROPOFOL 30 MG: 10 INJECTION, EMULSION INTRAVENOUS at 10:00

## 2025-01-30 NOTE — ANESTHESIA POSTPROCEDURE EVALUATION
Post-Op Assessment Note    CV Status:  Stable  Pain Score: 0    Pain management: adequate       Mental Status:  Alert and awake   Hydration Status:  Euvolemic   PONV Controlled:  Controlled   Airway Patency:  Patent     Post Op Vitals Reviewed: Yes    No anethesia notable event occurred.    Staff: Anesthesiologist, CRNA           Last Filed PACU Vitals:  Vitals Value Taken Time   Temp 97.7 °F (36.5 °C) 01/30/25 1013   Pulse 61 01/30/25 1013   BP 94/54 01/30/25 1013   Resp 18 01/30/25 1013   SpO2 98 % 01/30/25 1013       Modified Angelia:     Vitals Value Taken Time   Activity 2 01/30/25 1014   Respiration 2 01/30/25 1014   Circulation 2 01/30/25 1014   Consciousness 1 01/30/25 1014   Oxygen Saturation 2 01/30/25 1014     Modified Angelia Score: 9

## 2025-01-30 NOTE — H&P
History and Physical - [facility]  Mirna Ly 54 y.o. female MRN: [id number]                  HPI: Mirna Ly is a 54 y.o. year old female who presents for open access screening colonoscopy.      REVIEW OF SYSTEMS: Per the HPI, and otherwise unremarkable.    Historical Information   Past Medical History:   Diagnosis Date    Abnormal Pap smear of cervix 2018    Have had 3 or 4 over the years, 2018 is most recent    Acute sinusitis     Allergic rhinitis     Onset: [id number]    Anemia     Dysplasia of cervix     MILD  LAST ASSESSED 8/18/2014    Head ache     HPV in female     LGSIL of cervix of undetermined significance     Low grade squamous intraepithelial lesion (LGSIL) on Papanicolaou smear of cervix     Resolved: 2014    Mass of chin     Mild dysplasia of cervix (ELIANA I)     Last Assessed: [id number]    Mononucleosis     Resolved: 1992    Normal delivery     2003 son, 2005 daughter    Pyelonephritis     UTI (urinary tract infection)      Past Surgical History:   Procedure Laterality Date    BREAST BIOPSY Left 2016    CERVICAL BIOPSY  W/ LOOP ELECTRODE EXCISION  2018    COLPOSCOPY W/ BIOPSY / CURETTAGE  2014, 2018    LSIL    HYSTERECTOMY      MAMMO STEREOTACTIC BREAST BIOPSY LEFT (ALL INC) Left 07/10/2017    VT EXC B9 LESION MRGN XCP SK TG S/N/H/F/G 1.1-2.0CM Right 12/06/2017    Procedure: EXCISION NODULE CERVICAL REGION;  Surgeon: Walter Dillard MD;  [facility];  Service: General    VT LAPS TOTAL HYSTERECT 250 GM/< W/RMVL TUBE/OVARY N/A 05/27/2021    Procedure: ROBOTIC HYSTERECTOMY, BILATERAL SALPINGECTOMY, LEFT OOPHORECTOMY; ABLATION OF ENDOMETRIOSIS;  Surgeon: Saniya Graf MD;  [facility];  Service: Gynecology Oncology     Social History   Social History     Substance and Sexual Activity   Alcohol Use Not Currently    Alcohol/week: 4.0 standard drinks of alcohol    Types: 4 Standard drinks or equivalent per week    Comment: Socially     Social History  "    Substance and Sexual Activity   Drug Use Never     Social History     Tobacco Use   Smoking Status Never   Smokeless Tobacco Never     Family History   Problem Relation Age of Onset    Miscarriages / Stillbirths Mother     Scleroderma Father     No Known Problems Daughter     Prostate cancer Maternal Grandfather     Cancer Maternal Grandfather         prostate cancer    Heart attack Maternal Grandfather     ALS Paternal Grandmother     Parkinsonism Paternal Grandfather     No Known Problems Son     No Known Problems Maternal Aunt     Cancer Paternal Aunt     No Known Problems Paternal Aunt        Meds/Allergies       Current Outpatient Medications:     Cholecalciferol (VITAMIN D3 PO)    fluticasone (FLONASE) 50 mcg/act nasal spray    loratadine (CLARITIN) 10 mg tablet    Allergies   Allergen Reactions    Codeine GI Intolerance     Reaction Date: 17Nov2011;   Reaction Date: 11Feb2008;        Objective     /79   Pulse 71   Temp 97.6 °F (36.4 °C) (Temporal)   Resp 18   Ht 5' 6\" (1.676 m)   Wt 67.1 kg (148 lb)   LMP 05/16/2021 (Exact Date)   SpO2 100%   BMI 23.89 kg/m²       PHYSICAL EXAM    Gen: NAD  Head: NCAT  CV: RRR  CHEST: Clear  ABD: soft, NT/ND  EXT: no edema      ASSESSMENT/PLAN:  This is a 54 y.o. year old female here for colonoscopy, and she is stable and optimized for her procedure.        "

## 2025-01-30 NOTE — ANESTHESIA PREPROCEDURE EVALUATION
Procedure:  COLONOSCOPY    Relevant Problems   ANESTHESIA   (+) Motion sickness      HEMATOLOGY   (+) Iron deficiency anemia      Obstetrics/Gynecology   (+) History of robot-assisted laparoscopic hysterectomy        Physical Exam    Airway    Mallampati score: II  TM Distance: >3 FB  Neck ROM: full     Dental       Cardiovascular      Pulmonary      Other Findings  post-pubertal.      Anesthesia Plan  ASA Score- 2     Anesthesia Type- IV sedation with anesthesia with ASA Monitors.         Additional Monitors:     Airway Plan:            Plan Factors-    Chart reviewed.                      Induction- intravenous.    Postoperative Plan-         Informed Consent- Anesthetic plan and risks discussed with patient.  I personally reviewed this patient with the CRNA. Discussed and agreed on the Anesthesia Plan with the CRNA..      NPO Status:  No vitals data found for the desired time range.

## 2025-02-03 ENCOUNTER — RESULTS FOLLOW-UP (OUTPATIENT)
Age: 55
End: 2025-02-03

## 2025-02-03 PROCEDURE — 88305 TISSUE EXAM BY PATHOLOGIST: CPT | Performed by: PATHOLOGY

## 2025-02-20 ENCOUNTER — TELEPHONE (OUTPATIENT)
Age: 55
End: 2025-02-20

## 2025-03-28 ENCOUNTER — ESTABLISHED COMPREHENSIVE EXAM (OUTPATIENT)
Dept: URBAN - METROPOLITAN AREA CLINIC 6 | Facility: CLINIC | Age: 55
End: 2025-03-28

## 2025-03-28 DIAGNOSIS — H52.13: ICD-10-CM

## 2025-03-28 DIAGNOSIS — Z46.0: ICD-10-CM

## 2025-03-28 DIAGNOSIS — H25.013: ICD-10-CM

## 2025-03-28 PROCEDURE — 92015 DETERMINE REFRACTIVE STATE: CPT

## 2025-03-28 PROCEDURE — 92012 INTRM OPH EXAM EST PATIENT: CPT

## 2025-03-28 ASSESSMENT — KERATOMETRY
OS_K1POWER_DIOPTERS: 45.25
OS_AXISANGLE_DEGREES: 180
OD_K1POWER_DIOPTERS: 44.25
OD_K1POWER_DIOPTERS: 44.75
OS_AXISANGLE2_DEGREES: 90
OD_K2POWER_DIOPTERS: 45.00
OD_K2POWER_DIOPTERS: 44.75
OS_K2POWER_DIOPTERS: 45.25
OD_AXISANGLE_DEGREES: 002
OD_AXISANGLE2_DEGREES: 92
OD_AXISANGLE_DEGREES: 180
OD_AXISANGLE2_DEGREES: 90

## 2025-03-28 ASSESSMENT — TONOMETRY
OD_IOP_MMHG: 10
OS_IOP_MMHG: 11

## 2025-03-28 ASSESSMENT — VISUAL ACUITY
OS_CC: 20/30
OD_CC: 20/30-1
OS_CC: 20/25
OD_PH: 20/20-1
OU_CC: J1
OD_CC: 20/25

## 2025-07-23 ENCOUNTER — FOLLOW UP (OUTPATIENT)
Dept: URBAN - METROPOLITAN AREA CLINIC 6 | Facility: CLINIC | Age: 55
End: 2025-07-23

## 2025-07-23 ENCOUNTER — OFFICE VISIT (OUTPATIENT)
Dept: FAMILY MEDICINE CLINIC | Facility: CLINIC | Age: 55
End: 2025-07-23
Payer: COMMERCIAL

## 2025-07-23 VITALS
WEIGHT: 150.8 LBS | HEART RATE: 68 BPM | HEIGHT: 67 IN | RESPIRATION RATE: 18 BRPM | OXYGEN SATURATION: 98 % | SYSTOLIC BLOOD PRESSURE: 110 MMHG | DIASTOLIC BLOOD PRESSURE: 80 MMHG | TEMPERATURE: 97.8 F | BODY MASS INDEX: 23.67 KG/M2

## 2025-07-23 DIAGNOSIS — D22.9 MULTIPLE NEVI: ICD-10-CM

## 2025-07-23 DIAGNOSIS — D31.32: ICD-10-CM

## 2025-07-23 DIAGNOSIS — Z78.0 ENCOUNTER FOR OSTEOPOROSIS SCREENING IN ASYMPTOMATIC POSTMENOPAUSAL PATIENT: ICD-10-CM

## 2025-07-23 DIAGNOSIS — E53.8 VITAMIN B12 DEFICIENCY: ICD-10-CM

## 2025-07-23 DIAGNOSIS — Z13.220 SCREENING, LIPID: ICD-10-CM

## 2025-07-23 DIAGNOSIS — Z00.00 ANNUAL PHYSICAL EXAM: Primary | ICD-10-CM

## 2025-07-23 DIAGNOSIS — Z13.820 ENCOUNTER FOR OSTEOPOROSIS SCREENING IN ASYMPTOMATIC POSTMENOPAUSAL PATIENT: ICD-10-CM

## 2025-07-23 DIAGNOSIS — T78.40XA ALLERGIC REACTION, INITIAL ENCOUNTER: ICD-10-CM

## 2025-07-23 PROCEDURE — 92250 FUNDUS PHOTOGRAPHY W/I&R: CPT

## 2025-07-23 PROCEDURE — 99396 PREV VISIT EST AGE 40-64: CPT | Performed by: FAMILY MEDICINE

## 2025-07-23 PROCEDURE — 99213 OFFICE O/P EST LOW 20 MIN: CPT

## 2025-07-23 ASSESSMENT — VISUAL ACUITY
OD_CC: 20/30
OS_CC: 20/25-1

## 2025-07-23 ASSESSMENT — TONOMETRY
OS_IOP_MMHG: 15
OD_IOP_MMHG: 16

## 2025-07-23 NOTE — PATIENT INSTRUCTIONS
"Patient Education     Routine physical for adults   The Basics   Written by the doctors and editors at [facility]   What is a physical? -- A physical is a routine visit, or \"check-up,\" with your doctor. You might also hear it called a \"wellness visit\" or \"preventive visit.\"  During each visit, the doctor will:   Ask about your physical and mental health   Ask about your habits, behaviors, and lifestyle   Do an exam   Give you vaccines if needed   Talk to you about any medicines you take   Give advice about your health   Answer your questions  Getting regular check-ups is an important part of taking care of your health. It can help your doctor find and treat any problems you have. But it's also important for preventing health problems.  A routine physical is different from a \"sick visit.\" A sick visit is when you see a doctor because of a health concern or problem. Since physicals are scheduled ahead of time, you can think about what you want to ask the doctor.  How often should I get a physical? -- It depends on your age and health. In general, for people age 21 years and older:   If you are younger than 50 years, you might be able to get a physical every 3 years.   If you are 50 years or older, your doctor might recommend a physical every year.  If you have an ongoing health condition, like diabetes or high blood pressure, your doctor will probably want to see you more often.  What happens during a physical? -- In general, each visit will include:   Physical exam - The doctor or nurse will check your height, weight, heart rate, and blood pressure. They will also look at your eyes and ears. They will ask about how you are feeling and whether you have any symptoms that bother you.   Medicines - It's a good idea to bring a list of all the medicines you take to each doctor visit. Your doctor will talk to you about your medicines and answer any questions. Tell them if you are having any side effects that bother you. You " "should also tell them if you are having trouble paying for any of your medicines.   Habits and behaviors - This includes:   Your diet   Your exercise habits   Whether you smoke, drink alcohol, or use drugs   Whether you are sexually active   Whether you feel safe at home  Your doctor will talk to you about things you can do to improve your health and lower your risk of health problems. They will also offer help and support. For example, if you want to quit smoking, they can give you advice and might prescribe medicines. If you want to improve your diet or get more physical activity, they can help you with this, too.   Lab tests, if needed - The tests you get will depend on your age and situation. For example, your doctor might want to check your:   Cholesterol   Blood sugar   Iron level   Vaccines - The recommended vaccines will depend on your age, health, and what vaccines you already had. Vaccines are very important because they can prevent certain serious or deadly infections.   Discussion of screening - \"Screening\" means checking for diseases or other health problems before they cause symptoms. Your doctor can recommend screening based on your age, risk, and preferences. This might include tests to check for:   Cancer, such as breast, prostate, cervical, ovarian, colorectal, prostate, lung, or skin cancer   Sexually transmitted infections, such as chlamydia and gonorrhea   Mental health conditions like depression and anxiety  Your doctor will talk to you about the different types of screening tests. They can help you decide which screenings to have. They can also explain what the results might mean.   Answering questions - The physical is a good time to ask the doctor or nurse questions about your health. If needed, they can refer you to other doctors or specialists, too.  Adults older than 65 years often need other care, too. As you get older, your doctor will talk to you about:   How to prevent falling at " home   Hearing or vision tests   Memory testing   How to take your medicines safely   Making sure that you have the help and support you need at home  All topics are updated as new evidence becomes available and our peer review process is complete.  This topic retrieved from [employer] on: May 02, 2024.  Topic 041107 Version 1.0  Release: 32.4.3 - C32.122  © 2024 UpToDate, Inc. and/or its affiliates. All rights reserved.  Consumer Information Use and Disclaimer   Disclaimer: This generalized information is a limited summary of diagnosis, treatment, and/or medication information. It is not meant to be comprehensive and should be used as a tool to help the user understand and/or assess potential diagnostic and treatment options. It does NOT include all information about conditions, treatments, medications, side effects, or risks that may apply to a specific patient. It is not intended to be medical advice or a substitute for the medical advice, diagnosis, or treatment of a health care provider based on the health care provider's examination and assessment of a patient's specific and unique circumstances. Patients must speak with a health care provider for complete information about their health, medical questions, and treatment options, including any risks or benefits regarding use of medications. This information does not endorse any treatments or medications as safe, effective, or approved for treating a specific patient. UpToDate, Inc. and its affiliates disclaim any warranty or liability relating to this information or the use thereof.The use of this information is governed by the Terms of Use, available at https://www.woltersProfilepasseruwer.com/en/know/clinical-effectiveness-terms. 2024© UpToDate, Inc. and its affiliates and/or licensors. All rights reserved.  Copyright   © 2024 UpToDate, Inc. and/or its affiliates. All rights reserved.

## 2025-07-23 NOTE — PROGRESS NOTES
Adult Annual Physical  Name: Mrina Ly      : 1970      MRN: [id number]  Encounter Provider: Ladonna Wade DO  Encounter Date: 2025   Encounter department: [facility]    :  Assessment & Plan  Annual physical exam  Gyn up to date  Dexa ordered  Labs ordered       Screening, lipid    Orders:  •  Lipid panel; Future    Vitamin B12 deficiency    Orders:  •  Vitamin B12; Future    Encounter for osteoporosis screening in asymptomatic postmenopausal patient    Orders:  •  DXA bone density spine hip and pelvis; Future    Allergic reaction, initial encounter    Orders:  •  Ambulatory Referral to Allergy; Future    Multiple nevi    Orders:  •  Ambulatory Referral to Dermatology; Future        Preventive Screenings:    - Cervical cancer screening: has history of cervical cancer and screening not indicated   - Breast cancer screening: screening up-to-date     Immunizations:  - Immunizations due: Prevnar 20         History of Present Illness     Adult Annual Physical:  Patient presents for annual physical. Here for new pat visit  Had hysterectomy  Having some hot flashes  Possible sesame allergy.     Diet and Physical Activity:  - Diet/Nutrition: consuming 3-5 servings of fruits/vegetables daily, well balanced diet, adequate fiber intake, adequate whole grain intake and limited junk food.  - Exercise: moderate cardiovascular exercise, walking, 3-4 times a week on average and 30-60 minutes on average.    Depression Screening:  - PHQ-2 Score: 0    General Health:  - Sleep: sleeps well and 7-8 hours of sleep on average.  - Hearing: normal hearing bilateral ears.  - Vision: most recent eye exam < 1 year ago, no vision problems and wears glasses and contacts.  - Dental: brushes teeth twice daily, floss regularly and regular dental visits.    /GYN Health:  - Follows with GYN: yes.   - Menopause: postmenopausal.   - History of STDs: no  - Contraception: hysterectomy.      Advanced Care  "Planning:  - Has an advanced directive?: yes    - Has a durable medical POA?: no    - ACP document given to patient?: yes      Review of Systems   Constitutional: Negative.    HENT: Negative.     Eyes: Negative.    Respiratory: Negative.     Cardiovascular: Negative.    Gastrointestinal: Negative.    Endocrine: Negative.    Genitourinary: Negative.    Musculoskeletal: Negative.    Allergic/Immunologic: Negative.    Neurological: Negative.    Hematological: Negative.    Psychiatric/Behavioral: Negative.       Medical History Reviewed by provider this encounter:  Tobacco  Allergies  Meds  Problems  Med Hx  Surg Hx  Fam Hx     .    Objective   /80 (BP Location: Left arm, Patient Position: Sitting, Cuff Size: Standard)   Pulse 68   Temp 97.8 °F (36.6 °C) (Tympanic)   Resp 18   Ht 5' 6.58\" (1.691 m)   Wt 68.4 kg (150 lb 12.8 oz)   LMP 05/16/2021 (Exact Date)   SpO2 98%   BMI 23.92 kg/m²     Physical Exam  Vitals and nursing note reviewed.   Constitutional:       Appearance: Normal appearance. She is well-developed.   HENT:      Head: Normocephalic and atraumatic.      Right Ear: External ear normal.      Left Ear: External ear normal.      Nose: Nose normal.     Eyes:      Extraocular Movements: Extraocular movements intact.      Conjunctiva/sclera: Conjunctivae normal.      Pupils: Pupils are equal, round, and reactive to light.       Cardiovascular:      Rate and Rhythm: Normal rate and regular rhythm.      Heart sounds: Normal heart sounds.   Pulmonary:      Effort: Pulmonary effort is normal.      Breath sounds: Normal breath sounds.   Abdominal:      General: Abdomen is flat. Bowel sounds are normal.      Palpations: Abdomen is soft.     Musculoskeletal:         General: Normal range of motion.      Cervical back: Normal range of motion and neck supple.     Skin:     General: Skin is warm and dry.      Capillary Refill: Capillary refill takes less than 2 seconds.     Neurological:      General: " No focal deficit present.      Mental Status: She is alert and oriented to person, place, and time.     Psychiatric:         Mood and Affect: Mood normal.         Behavior: Behavior normal.         Thought Content: Thought content normal.         Judgment: Judgment normal.

## 2025-08-13 LAB
CHOLEST SERPL-MCNC: 220 MG/DL
CHOLEST/HDLC SERPL: 2.7 (CALC)
HDLC SERPL-MCNC: 81 MG/DL
LDLC SERPL CALC-MCNC: 123 MG/DL (CALC)
NONHDLC SERPL-MCNC: 139 MG/DL (CALC)
TRIGL SERPL-MCNC: 66 MG/DL
VIT B12 SERPL-MCNC: 354 PG/ML (ref 200–1100)

## (undated) DEVICE — 1820 FOAM BLOCK NEEDLE COUNTER: Brand: DEVON

## (undated) DEVICE — GLOVE INDICATOR PI UNDERGLOVE SZ 6.5 BLUE

## (undated) DEVICE — CHLORAPREP HI-LITE 26ML ORANGE

## (undated) DEVICE — MONOPOLAR CURVED SCISSORS: Brand: ENDOWRIST

## (undated) DEVICE — TIP COVER ACCESSORY

## (undated) DEVICE — CLAMP TOWEL TUBING DISPOSABLE

## (undated) DEVICE — PROGRASP FORCEPS: Brand: ENDOWRIST

## (undated) DEVICE — SUT STRATAFIX SPIRAL 2-0 CT-1 30 CM SXPP1B410

## (undated) DEVICE — INTENDED FOR TISSUE SEPARATION, AND OTHER PROCEDURES THAT REQUIRE A SHARP SURGICAL BLADE TO PUNCTURE OR CUT.: Brand: BARD-PARKER SAFETY BLADES SIZE 11, STERILE

## (undated) DEVICE — ADHESIVE SKIN HIGH VISCOSITY EXOFIN 1ML

## (undated) DEVICE — GLOVE PI ULTRA TOUCH SZ.6.5

## (undated) DEVICE — OCCLUDER COLPO-PNEUMO

## (undated) DEVICE — 3000CC GUARDIAN II: Brand: GUARDIAN

## (undated) DEVICE — CHLORHEXIDINE 4PCT 4 OZ

## (undated) DEVICE — SYNCHROSEAL: Brand: DA VINCI ENERGY

## (undated) DEVICE — TRAY FOLEY 16FR URIMETER SILICONE SURESTEP

## (undated) DEVICE — ENDOPATH PNEUMONEEDLE INSUFFLATION NEEDLES WITH LUER LOCK CONNECTORS 120MM: Brand: ENDOPATH

## (undated) DEVICE — SPECIMEN TRAP: Brand: ARGYLE

## (undated) DEVICE — 40595 XL TRENDELENBURG POSITIONING KIT: Brand: 40595 XL TRENDELENBURG POSITIONING KIT

## (undated) DEVICE — SUT MONOCRYL 4-0 PS-2 27 IN Y426H

## (undated) DEVICE — LIGACLIP MCA MULTIPLE CLIP APPLIERS, 20 MEDIUM CLIPS: Brand: LIGACLIP

## (undated) DEVICE — NEEDLE 25G X 1 1/2

## (undated) DEVICE — UTERINE MANIPULATOR RUMI 6.7 X 8 CM

## (undated) DEVICE — STRL UNIVERSAL MINOR GENERAL: Brand: CARDINAL HEALTH

## (undated) DEVICE — SUT VICRYL 0 UR-6 27 IN J603H

## (undated) DEVICE — MAYO STAND COVER: Brand: CONVERTORS

## (undated) DEVICE — BLADELESS OBTURATOR: Brand: WECK VISTA

## (undated) DEVICE — COLUMN DRAPE

## (undated) DEVICE — LUBRICANT INST ELECTROLUBE ANTISTK WO PAD

## (undated) DEVICE — ARM DRAPE

## (undated) DEVICE — CANNULA SEAL

## (undated) DEVICE — ADHESIVE SKN CLSR HISTOACRYL FLEX 0.5ML LF

## (undated) DEVICE — TROCAR PORT ACCESS 5 X120MML W/BLDLS OPTICAL TIP AIRSEAL

## (undated) DEVICE — KIT, BETHLEHEM ROBOTIC PROST: Brand: CARDINAL HEALTH

## (undated) DEVICE — GLOVE INDICATOR PI UNDERGLOVE SZ 7 BLUE

## (undated) DEVICE — INTENDED FOR TISSUE SEPARATION, AND OTHER PROCEDURES THAT REQUIRE A SHARP SURGICAL BLADE TO PUNCTURE OR CUT.: Brand: BARD-PARKER SAFETY BLADES SIZE 15, STERILE

## (undated) DEVICE — PREMIUM DRY TRAY LF: Brand: MEDLINE INDUSTRIES, INC.

## (undated) DEVICE — GLOVE SRG BIOGEL ECLIPSE 7

## (undated) DEVICE — AIRSEAL TUBE SMOKE EVAC LUMENX3 FILTERED

## (undated) DEVICE — VISUALIZATION SYSTEM: Brand: CLEARIFY

## (undated) DEVICE — SYRINGE 50ML LL

## (undated) DEVICE — LARGE NEEDLE DRIVER: Brand: ENDOWRIST

## (undated) DEVICE — PLUMEPEN PRO 10FT